# Patient Record
Sex: MALE | Race: WHITE | NOT HISPANIC OR LATINO | Employment: FULL TIME | URBAN - METROPOLITAN AREA
[De-identification: names, ages, dates, MRNs, and addresses within clinical notes are randomized per-mention and may not be internally consistent; named-entity substitution may affect disease eponyms.]

---

## 2017-06-21 ENCOUNTER — TELEPHONE (OUTPATIENT)
Dept: FAMILY MEDICINE | Facility: CLINIC | Age: 47
End: 2017-06-21

## 2017-06-21 NOTE — TELEPHONE ENCOUNTER
Spoke with patient on phone.  Patient states that I referred him to a psychiatrist in [place] last year and he went to see him and doing well but needs to make a follow up appointment and could not remember name to look up number.  Reports I had given him a paper with a group of names from a group in [place] - advised patient that I do refer to [facility] in [place] - called out list of names and patient thinks he saw Dr. Justin - gave patient name and phone number to practice.

## 2017-06-21 NOTE — TELEPHONE ENCOUNTER
----- Message from Karma Light sent at 6/21/2017 12:30 PM CDT -----  Contact: [phone]/self  Patient requesting to speak with you regarding name of  Psychiatric doctor. Please advise.

## 2017-06-22 ENCOUNTER — OFFICE VISIT (OUTPATIENT)
Dept: FAMILY MEDICINE | Facility: CLINIC | Age: 47
End: 2017-06-22
Payer: COMMERCIAL

## 2017-06-22 VITALS
HEIGHT: 74 IN | SYSTOLIC BLOOD PRESSURE: 120 MMHG | TEMPERATURE: 99 F | OXYGEN SATURATION: 98 % | DIASTOLIC BLOOD PRESSURE: 82 MMHG | HEART RATE: 68 BPM | BODY MASS INDEX: 31.04 KG/M2 | WEIGHT: 241.88 LBS

## 2017-06-22 DIAGNOSIS — F32.A ANXIETY AND DEPRESSION: Primary | ICD-10-CM

## 2017-06-22 DIAGNOSIS — F41.9 ANXIETY AND DEPRESSION: Primary | ICD-10-CM

## 2017-06-22 PROCEDURE — 99999 PR PBB SHADOW E&M-EST. PATIENT-LVL III: CPT | Mod: PBBFAC,,, | Performed by: NURSE PRACTITIONER

## 2017-06-22 PROCEDURE — 99213 OFFICE O/P EST LOW 20 MIN: CPT | Mod: S$GLB,,, | Performed by: NURSE PRACTITIONER

## 2017-06-22 RX ORDER — CITALOPRAM 10 MG/1
10 TABLET ORAL DAILY
Qty: 30 TABLET | Refills: 1 | Status: SHIPPED | OUTPATIENT
Start: 2017-06-22 | End: 2019-04-26 | Stop reason: ALTCHOICE

## 2017-06-22 NOTE — PROGRESS NOTES
Subjective:       Patient ID: Barney Kowalski is a 47 y.o. male.    Chief Complaint: Follow-up (medication check)    Patient is here today to discuss a medication change.    Patient has chronic anxiety/depression problems.  I had referred patient to Psychiatrist at last visit for medication management.  Patient is seeing Dr. Justin at [facility]. [facility].  Patient reports he was unable to get a follow up appointment with Dr. Justin [facility] 7/26/17.  Patient currently takes Lexapro 10 mg daily but complains of worsening headache and uncontrolled depression on Lexapro 10 mg .  He has been tried on several different medications in the past.  Patient has been on Cymbalta in past - reports it works but complained of sexual side effects.  Patient was then tried on Wellbutriin XL and Xanax only sparingly - patient did not take the Wellbutrin because he stated it worsened the anxiety but continued to take the Xanax but found he needed Xanax more frequently and was developing some dependency.  Patient reports he went to [facility] in [place].  He states that is where he went went he had alcohol dependency in the past and was in a 60 day inpatient program and has now been sober since 2009.  Patient states he returned to [facility] when there was concern for Xanax dependency.  He reports they put him on Lexapro and Gabapentin.  Patient is still taking the Lexapro and states it helps with anxiety but again experiencing depression,sexual dysfunction, problems with insomnia, tension headaches and grinding teeth.  States he has several family members that are well controlled on Celexa and would like to change medication.              Past Medical History:   Diagnosis Date    Alcohol addiction     Sober since 2009 - alcohol addiction - did 60 day inpatient services    Anxiety     Depression     Diverticulitis of colon     GERD (gastroesophageal reflux disease)     Hypertension 07/2016     patient states Cardiologist now prescribing medication - Dr. Macias       Past Surgical History:   Procedure Laterality Date    COLONOSCOPY  10/29/2014    DIVERTICULOSIS SIGMOID COLON; INTERNAL HEMORRHOIDS; OTHERWISE NORMAL.  REPEAT IN 10 YEARS    TONSILLECTOMY         Family History   Problem Relation Age of Onset    Hypertension Mother     No Known Problems Father     Cancer Maternal Grandmother      breast passed age 63    Hypertension Sister        Social History     Social History    Marital status:      Spouse name: N/A    Number of children: N/A    Years of education: N/A     Occupational History     [employer]     Social History Main Topics    Smoking status: Never Smoker    Smokeless tobacco: Never Used    Alcohol use No    Drug use: No    Sexual activity: Not Asked     Other Topics Concern    None     Social History Narrative    None       Review of Systems   Constitutional: Negative for activity change, appetite change, fatigue, fever and unexpected weight change.   HENT: Negative for congestion, ear pain, mouth sores, nosebleeds, postnasal drip, rhinorrhea, sinus pressure, sneezing, sore throat, trouble swallowing and voice change.         Grinding teeth, left jaw pain   Eyes: Negative.    Respiratory: Negative for cough, chest tightness and shortness of breath.    Cardiovascular: Negative for chest pain, palpitations and leg swelling.   Gastrointestinal: Negative.  Negative for abdominal pain, blood in stool, constipation, diarrhea, nausea and vomiting.   Endocrine: Negative.    Genitourinary: Negative for difficulty urinating, dysuria, flank pain, hematuria and urgency.   Musculoskeletal: Negative for arthralgias, back pain, gait problem, joint swelling, myalgias and neck pain.   Skin: Negative for color change, rash and wound.   Allergic/Immunologic: Negative for immunocompromised state.   Neurological: Positive for headaches. Negative for dizziness, tremors, seizures, syncope and  "speech difficulty.   Hematological: Negative for adenopathy. Does not bruise/bleed easily.   Psychiatric/Behavioral: Positive for decreased concentration, dysphoric mood and sleep disturbance. Negative for behavioral problems and suicidal ideas. The patient is nervous/anxious.          Objective:     Vitals:    06/22/17 1523   BP: 120/82   BP Location: Right arm   Patient Position: Sitting   BP Method: Manual   Pulse: 68   Temp: 98.8 °F (37.1 °C)   TempSrc: Oral   SpO2: 98%   Weight: 109.7 kg (241 lb 13.5 oz)   Height: 6' 2" (1.88 m)          Physical Exam   Constitutional: He is oriented to person, place, and time. He appears well-developed and well-nourished.   HENT:   Head: Normocephalic.   Right Ear: External ear normal.   Left Ear: External ear normal.   Nose: Nose normal.   Mouth/Throat: Oropharynx is clear and moist. No oropharyngeal exudate.   Eyes: EOM are normal. Pupils are equal, round, and reactive to light. Right eye exhibits no discharge. Left eye exhibits no discharge. No scleral icterus.   Neck: Normal range of motion. Neck supple. No tracheal deviation present. No thyromegaly present.   Cardiovascular: Normal rate, regular rhythm and normal heart sounds.    No murmur heard.  Pulmonary/Chest: Effort normal and breath sounds normal. No respiratory distress.   Abdominal: Soft. He exhibits no distension.   Musculoskeletal: Normal range of motion. He exhibits no edema.   Lymphadenopathy:     He has no cervical adenopathy.   Neurological: He is alert and oriented to person, place, and time. Coordination normal.   Skin: Skin is warm and dry. No rash noted.   Psychiatric: His speech is normal and behavior is normal. His mood appears anxious. He exhibits a depressed mood.         Assessment:         ICD-10-CM ICD-9-CM   1. Anxiety and depression F41.9 300.00    F32.9 311       Plan:       Anxiety and depression  -  Advised patient that I am willing to change him from Lexapro to Celexa 10 mg daily but keep " follow up appointment with Dr. Justin on 7/26/17 for all future psych. Medical management - patient verbalizes understanding.  -     citalopram (CELEXA) 10 MG tablet; Take 1 tablet (10 mg total) by mouth once daily.  Dispense: 30 tablet; Refill: 1      Return for Keep appt with psych on 7/26/2017.     Patient's Medications   New Prescriptions    CITALOPRAM (CELEXA) 10 MG TABLET    Take 1 tablet (10 mg total) by mouth once daily.   Previous Medications    OMEPRAZOLE (PRILOSEC) 40 MG CAPSULE    Take 40 mg by mouth once daily.    TEKTURNA 300 MG TAB    Take 150 mg by mouth once daily.    Modified Medications    No medications on file   Discontinued Medications    ESCITALOPRAM OXALATE (LEXAPRO) 10 MG TABLET    Take 1 tablet (10 mg total) by mouth once daily.    TEKTURNA 150 MG TAB    TAKE 1 TABLET (150 MG TOTAL) BY MOUTH ONCE DAILY.    TRAZODONE (DESYREL) 50 MG TABLET    Titrate up to 3 tablets at bedtime as needed for sleep

## 2019-03-27 DIAGNOSIS — Z00.00 ROUTINE GENERAL MEDICAL EXAMINATION AT A HEALTH CARE FACILITY: Primary | ICD-10-CM

## 2019-04-26 ENCOUNTER — CLINICAL SUPPORT (OUTPATIENT)
Dept: INTERNAL MEDICINE | Facility: CLINIC | Age: 49
End: 2019-04-26

## 2019-04-26 ENCOUNTER — HOSPITAL ENCOUNTER (OUTPATIENT)
Dept: RADIOLOGY | Facility: HOSPITAL | Age: 49
Discharge: HOME | End: 2019-04-26
Attending: INTERNAL MEDICINE

## 2019-04-26 ENCOUNTER — HOSPITAL ENCOUNTER (OUTPATIENT)
Dept: CARDIOLOGY | Facility: CLINIC | Age: 49
Discharge: HOME | End: 2019-04-26
Attending: INTERNAL MEDICINE

## 2019-04-26 ENCOUNTER — OFFICE VISIT (OUTPATIENT)
Dept: PULMONOLOGY | Facility: CLINIC | Age: 49
End: 2019-04-26

## 2019-04-26 VITALS
BODY MASS INDEX: 28.23 KG/M2 | SYSTOLIC BLOOD PRESSURE: 137 MMHG | DIASTOLIC BLOOD PRESSURE: 83 MMHG | WEIGHT: 220 LBS | HEART RATE: 48 BPM | HEIGHT: 74 IN

## 2019-04-26 DIAGNOSIS — Z00.00 ROUTINE GENERAL MEDICAL EXAMINATION AT A HEALTH CARE FACILITY: Primary | ICD-10-CM

## 2019-04-26 DIAGNOSIS — Z00.00 ANNUAL PHYSICAL EXAM: Primary | ICD-10-CM

## 2019-04-26 DIAGNOSIS — Z00.00 ROUTINE GENERAL MEDICAL EXAMINATION AT A HEALTH CARE FACILITY: ICD-10-CM

## 2019-04-26 LAB
ALBUMIN SERPL BCP-MCNC: 4 G/DL (ref 3.5–5.2)
ALP SERPL-CCNC: 56 U/L (ref 55–135)
ALT SERPL W/O P-5'-P-CCNC: 23 U/L (ref 10–44)
ANION GAP SERPL CALC-SCNC: 7 MMOL/L (ref 8–16)
AST SERPL-CCNC: 17 U/L (ref 10–40)
BILIRUB SERPL-MCNC: 0.6 MG/DL (ref 0.1–1)
BUN SERPL-MCNC: 14 MG/DL (ref 6–20)
CALCIUM SERPL-MCNC: 10 MG/DL (ref 8.7–10.5)
CHLORIDE SERPL-SCNC: 105 MMOL/L (ref 95–110)
CHOLEST SERPL-MCNC: 170 MG/DL (ref 120–199)
CHOLEST/HDLC SERPL: 3 {RATIO} (ref 2–5)
CO2 SERPL-SCNC: 28 MMOL/L (ref 23–29)
COMPLEXED PSA SERPL-MCNC: 0.59 NG/ML (ref 0–4)
CREAT SERPL-MCNC: 1 MG/DL (ref 0.5–1.4)
CV STRESS BASE HR: 46 BPM
DIASTOLIC BLOOD PRESSURE: 87 MMHG
ERYTHROCYTE [DISTWIDTH] IN BLOOD BY AUTOMATED COUNT: 12.2 % (ref 11.5–14.5)
EST. GFR  (AFRICAN AMERICAN): >60 ML/MIN/1.73 M^2
EST. GFR  (NON AFRICAN AMERICAN): >60 ML/MIN/1.73 M^2
ESTIMATED AVG GLUCOSE: 114 MG/DL (ref 68–131)
GLUCOSE SERPL-MCNC: 88 MG/DL (ref 70–110)
HBA1C MFR BLD HPLC: 5.6 % (ref 4–5.6)
HCT VFR BLD AUTO: 45.6 % (ref 40–54)
HDLC SERPL-MCNC: 57 MG/DL (ref 40–75)
HDLC SERPL: 33.5 % (ref 20–50)
HGB BLD-MCNC: 15.4 G/DL (ref 14–18)
LDLC SERPL CALC-MCNC: 103.2 MG/DL (ref 63–159)
MCH RBC QN AUTO: 28.2 PG (ref 27–31)
MCHC RBC AUTO-ENTMCNC: 33.8 G/DL (ref 32–36)
MCV RBC AUTO: 84 FL (ref 82–98)
NONHDLC SERPL-MCNC: 113 MG/DL
OHS CV CPX 1 MINUTE RECOVERY HEART RATE: 121 BPM
OHS CV CPX 85 PERCENT MAX PREDICTED HEART RATE MALE: 148
OHS CV CPX ESTIMATED METS: 20
OHS CV CPX MAX PREDICTED HEART RATE: 174
OHS CV CPX PATIENT IS FEMALE: 0
OHS CV CPX PATIENT IS MALE: 1
OHS CV CPX PEAK DIASTOLIC BLOOD PRESSURE: 65 MMHG
OHS CV CPX PEAK HEAR RATE: 153 BPM
OHS CV CPX PEAK RATE PRESSURE PRODUCT: NORMAL
OHS CV CPX PEAK SYSTOLIC BLOOD PRESSURE: 150 MMHG
OHS CV CPX PERCENT MAX PREDICTED HEART RATE ACHIEVED: 88
OHS CV CPX PERCENT TARGET HEART RATE ACHIEVED: 104.08
OHS CV CPX RATE PRESSURE PRODUCT PRESENTING: 6026
OHS CV CPX TARGET HEART RATE: 147
PLATELET # BLD AUTO: 216 K/UL (ref 150–350)
PMV BLD AUTO: 10.2 FL (ref 9.2–12.9)
POTASSIUM SERPL-SCNC: 3.8 MMOL/L (ref 3.5–5.1)
PROT SERPL-MCNC: 6.8 G/DL (ref 6–8.4)
RBC # BLD AUTO: 5.46 M/UL (ref 4.6–6.2)
SODIUM SERPL-SCNC: 140 MMOL/L (ref 136–145)
STRESS ECHO POST EXERCISE DUR MIN: 12 MIN
STRESS ECHO POST EXERCISE DUR SEC: 45
SYSTOLIC BLOOD PRESSURE: 131 MMHG
TRIGL SERPL-MCNC: 49 MG/DL (ref 30–150)
TSH SERPL DL<=0.005 MIU/L-ACNC: 1.65 UIU/ML (ref 0.4–4)
WBC # BLD AUTO: 6.51 K/UL (ref 3.9–12.7)

## 2019-04-26 PROCEDURE — 71046 X-RAY EXAM CHEST 2 VIEWS: CPT | Mod: TC,FY

## 2019-04-26 PROCEDURE — 85027 COMPLETE CBC AUTOMATED: CPT

## 2019-04-26 PROCEDURE — 93015 TREADMILL STRESS TEST (CUPID ONLY): ICD-10-PCS | Mod: S$GLB,,, | Performed by: INTERNAL MEDICINE

## 2019-04-26 PROCEDURE — 97750 PHYSICAL PERFORMANCE TEST: CPT | Mod: S$GLB,,, | Performed by: INTERNAL MEDICINE

## 2019-04-26 PROCEDURE — 99999 PR PBB SHADOW E&M-EST. PATIENT-LVL III: CPT | Mod: PBBFAC,,, | Performed by: INTERNAL MEDICINE

## 2019-04-26 PROCEDURE — 36415 COLL VENOUS BLD VENIPUNCTURE: CPT

## 2019-04-26 PROCEDURE — 80061 LIPID PANEL: CPT

## 2019-04-26 PROCEDURE — 71046 X-RAY EXAM CHEST 2 VIEWS: CPT | Mod: 26,,, | Performed by: RADIOLOGY

## 2019-04-26 PROCEDURE — 84443 ASSAY THYROID STIM HORMONE: CPT

## 2019-04-26 PROCEDURE — 97750 PR PHYSICAL PERFORMANCE TEST: ICD-10-PCS | Mod: S$GLB,,, | Performed by: INTERNAL MEDICINE

## 2019-04-26 PROCEDURE — 97802 MEDICAL NUTRITION INDIV IN: CPT | Mod: S$GLB,,, | Performed by: INTERNAL MEDICINE

## 2019-04-26 PROCEDURE — 83036 HEMOGLOBIN GLYCOSYLATED A1C: CPT

## 2019-04-26 PROCEDURE — 99386 PR PREVENTIVE VISIT,NEW,40-64: ICD-10-PCS | Mod: S$GLB,,, | Performed by: INTERNAL MEDICINE

## 2019-04-26 PROCEDURE — 84153 ASSAY OF PSA TOTAL: CPT

## 2019-04-26 PROCEDURE — 99386 PREV VISIT NEW AGE 40-64: CPT | Mod: S$GLB,,, | Performed by: INTERNAL MEDICINE

## 2019-04-26 PROCEDURE — 99999 PR PBB SHADOW E&M-EST. PATIENT-LVL III: ICD-10-PCS | Mod: PBBFAC,,, | Performed by: INTERNAL MEDICINE

## 2019-04-26 PROCEDURE — 97802 PR MED NUTR THER, 1ST, INDIV, EA 15 MIN: ICD-10-PCS | Mod: S$GLB,,, | Performed by: INTERNAL MEDICINE

## 2019-04-26 PROCEDURE — 80053 COMPREHEN METABOLIC PANEL: CPT

## 2019-04-26 PROCEDURE — 71046 XR CHEST PA AND LATERAL: ICD-10-PCS | Mod: 26,,, | Performed by: RADIOLOGY

## 2019-04-26 PROCEDURE — 93015 CV STRESS TEST SUPVJ I&R: CPT | Mod: S$GLB,,, | Performed by: INTERNAL MEDICINE

## 2019-04-26 RX ORDER — SERTRALINE HYDROCHLORIDE 50 MG/1
1 TABLET, FILM COATED ORAL DAILY
Refills: 2 | COMMUNITY
Start: 2019-04-02 | End: 2022-03-14

## 2019-04-26 NOTE — PROGRESS NOTES
Subjective:       Patient ID: Barney Kowalski is a 48 y.o. male.    Chief Complaint: No chief complaint on file.    HPI   Pt. Has no significant cardiovascular or pulmonary history.    Physical Limitations:  None.      Current exercise routine:  Patient does not follow any formal exercise or flexibility routine at the current time.    Goals:  Patient set a gaol weight of 212 lbs.    Fun Facts:  Patient was very friendly and engaged.  Patient was very receptive to and thankful for all recommendations made.      Review of Systems    Objective:     The fitness evaluation results are as follows:  D.O.S. 4/26/2019   Height (in): 73.5   Weight (lbs): 222   BMI: 28.048186   Body Fat (%): 20.64   Waist (cm): 106   Hip (cm): 109   WHR: 0.97   RBP (mmHg): 126/84   RHR (bpm): 52    Strength R (lbs)t: 130    Strength Lt (lbs): 135   Push-up Assessment: 24   Curl-up Assessment: 33   Flexibility Testing (cm): 15   REE (kcals): 2430       Physical Exam    Assessment:     Age/gender stratified assessment:  Resting BP: Within Normal Limits   Body Fat %: Very Good   WHR Risk Factor: Moderate Risk    Strength R: Average    Strength L: Above Average   Upper Body Endurance: Very Good   Abdominal Endurance: Below Average   Lower body Flexibiltiy: Needs Improvement       1. Routine general medical examination at a health care facility        Plan:       Recommended fitness guidelines:    -150 minutes of moderate intensity aerobic exercise per week or 75 minutes of vigorous intensity aerobic exercise per week.  Try to reach 150 minutes of moderate to vigorous intensity aerobic activity per week.    -2 to 4 days per week of resistance training for each muscle group.   Start to incorporate the upper and lower body resistance training program along with the core stabilization program, given during the evaluation.      -Daily stretching with a hold of at least 30 seconds per muscle group.  Practice the seated hamstring  stretch, demonstrated during the evaluation, daily.

## 2019-04-26 NOTE — PROGRESS NOTES
"Nutrition Assessment  Client name:  Barney Kowalski  :  1970  Age:  48 y.o.  Gender:  male    Client states:  Very pleasant [employer] employee here for his initial Executive Health physical.  Employed with [employer] and commutes an hour to and from [employer] daily.   with two children, ages 16 and 17.  Has a history of anxiety, HTN, and GERD, which he treats with Prilosec every other day.  Was initially prescribed anti-hypertensive medications although no longer takes them as his BP is well controlled (120-130/80) since losing weight.  Formerly, weighed 245# yet adhered to the ketogenic diet for a month and thereafter, lost 20#, weighing 220#.  Realizes such diet is "probably not the healthiest" as he was primarily consuming eggs and castro every day.  Is interested in losing an additional 5-10# and so, wonders if his eating habits are good.  Typically skips breakfast followed by lunch provided by employer consisting of a deli meat sandwich, chips, water, and Coke or Coke Zero in addition to a home cooked meal prepared by his wife.  Notes that his wife has been attempting to prepare healthier meals for them, recalling dinner last night of grilled chicken and spaghetti squash.  Also, snacks throughout the day, attempting to limit vending machine and other such processed snacks provided at work.  Craves "something sweet" after dinner and so, indulges in two flavored Greek yogurts before bed.  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  Overall, desires to achieve goal weight of 210# via dietary modifications outlined below.    *Patient's preferred name is Khris.    Anthropometrics  Height:  6' 1.5"     Weight:  222#  BMI:  28.9  % Body Fat:  20.64%    Clinical Signs/Symptoms  N/V/D:  None  Appetite (Good, Fair, or Poor):  Good      Past Medical History:   Diagnosis Date    Anxiety     Anxiety and " depression 8/1/2016    Depression     Diverticulitis of colon     GERD (gastroesophageal reflux disease)     History of alcohol dependence     Sober since 2009 - alcohol addiction - did 60 day inpatient services       Past Surgical History:   Procedure Laterality Date    COLONOSCOPY  10/29/2014    DIVERTICULOSIS SIGMOID COLON; INTERNAL HEMORRHOIDS; OTHERWISE NORMAL.  REPEAT IN 10 YEARS    ESOPHAGOGASTRODUODENOSCOPY (EGD) N/A 10/31/2016    Performed by Pradip Bonner Jr., [facility] at [facility]    TONSILLECTOMY         Medications    has a current medication list which includes the following prescription(s): omeprazole, sertraline, and tekturna.    Vitamins, Minerals, and/or Supplements:  MVI (although does not take daily)     Food/Medication Interactions:  Reviewed     Food Allergies or Intolerances:  NKFA     Social History    Marital status:    Employment:  [employer]    Social History     Tobacco Use    Smoking status: Never Smoker    Smokeless tobacco: Never Used   Substance Use Topics    Alcohol use: No     Alcohol/week: 0.0 oz        Lab Reports   Total Cholesterol:  170    Triglycerides:  49  HDL:  57  LDL:  103.2   Glucose:  88  HbA1c:  5.6%  BP:  137/83     Food History  Breakfast:  Skips/coffee  Mid-morning Snack:  None  Lunch:  Deli meat sandwich on white + chips + Coke or Coke Zero + water  Mid-afternoon Snack:  Coffee  Dinner:  Chicken + spaghetti squash + Coke, Coke Zero, or water  H.S. Snack:   2 flavored Greek yogurts  *Fluid intake:  Coffee, water (~10 - 16 oz bottles/day), Coke, Coke Zero    Exercise History:  None    Cultural/Spiritual/Personal Preferences:  None identified    Support System:  Wife    State of Change:  Contemplation    Barriers to Change:  Time constraints managing two [employer]    Diagnosis    Overweight related to inadequate physical activity and imbalanced meals and snacks as evidenced by BMI:  28.9.    Intervention    RMR (Method:  Body [place]):  2430  kcal  Activity Factor:  1.3  DOMINICK:  3159 - 250 = 2909 kcal    Goals:  1.  Achieve goal weight of 210#  2.  Remain physically active via ADL  3.  Consider eliminating H.S. Snack  4.  Avoid skipping breakfast  5.  Incorporate lean protein with snacks (examples provided)  6.  Consider incorporating vegetables with lunch (ex. bagged salad, frozen vegetables, etc.)  7.  Defer BP management to MD/PCP    Nutrition Education  Reviewed CMP, lipid panel, and HbA1c, noting improved lipid panel over the past few years, which patient attributes to weight loss.  Supported patient's desire for modest weight loss, discussing BMI and ways to achieve via dietary modifications.  Encouraged patient to avoid meal skipping, reviewing the potential health consequences of such.  Suggested consideration of a meal replacement shake (ex. Premier Protein + 1 serving fruit) for ease and convenience.  Also, discussed healthy snacking, including components of a healthy snack, importance and benefits of protein pairing, food sources of lean protein, and examples of healthy snack choices.  Addressed patient's inquiry regarding sugar cravings at night, encouraging avoidance of meal skipping, increased protein intake with snacks, and inclusion of non-starchy vegetables with lunch for added fiber, satiety, and reduction in overeating.  Furthermore, reviewed the benefits of physical activity, encouraging maintenance of an active lifestyle as patient's current schedule does not allow time for it now.  (Took detailed notes for patient outlining goals discussed above, which patient verbalized understanding and commitment toward.)    Patient verbalized understanding of nutrition education and recommendations received.    Handouts Provided  Meal Planning Guide  Restaurant Guide  Eat Fit Shopping List  Eat Fit Inna  Fast Food Guide  Vitamin/Mineral Guide    Monitoring/Evaluation    Monitor the following:  Weight  BMI  % Body Fat  Caloric intake  Blood  pressure    Follow Up Plan:  Communication with referring healthcare provider is unnecessary at this time as patient presented as part of annual wellness exam.  However, will follow up with patient in 1-2 years.

## 2019-04-26 NOTE — LETTER
April 26, 2019    Barney MARTELL Dex  [address] [address]             [facility]  [address]  [place] [address]  Phone: [phone] Dear Mr. Kowalski:    Thank you for allowing me to serve you and perform your Executive Health exam on 4/26/2019. This letter will serve as a brief summary of the physical findings and laboratory/studies performed and recommendations at this time. Today's assessment is essentially normal. Eventhough you don't do regular exercise your stress test is way above average.         If you have any questions or concerns, please don't hesitate to call.    Sincerely,        Barney Bautista MD

## 2019-04-26 NOTE — PROGRESS NOTES
Subjective:       Patient ID: Barney Kowalski is a 48 y.o. male.    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 Meds: Zoloft, and prilosec    Review of Systems   Constitutional: Negative.    HENT: Negative.    Eyes: Negative.    Respiratory: Negative.    Cardiovascular: Negative.    Gastrointestinal: Negative.    Genitourinary: Negative.    Musculoskeletal: Negative.    Skin: Negative.    Neurological: Negative.    Psychiatric/Behavioral: The patient is nervous/anxious.         On medication for chronic anxiety   All other systems reviewed and are negative.      Objective:      Physical Exam   Constitutional: He is oriented to person, place, and time. He appears well-developed and well-nourished.   HENT:   Head: Normocephalic and atraumatic.   Right Ear: External ear normal.   Left Ear: External ear normal.   Eyes: Pupils are equal, round, and reactive to light. Conjunctivae and EOM are normal.   Neck: Normal range of motion. Neck supple.   Cardiovascular: Normal rate, regular rhythm and normal heart sounds.   Negative Stress EKG at high workload, 20 METS   Pulmonary/Chest: Effort normal and breath sounds normal.   Peak flow 700 l/min   Abdominal: Soft. Bowel sounds are normal.   Musculoskeletal: Normal range of motion.   Neurological: He is alert and oriented to person, place, and time. He has normal reflexes.   Skin: Skin is warm and dry.   Psychiatric: He has a normal mood and affect. His behavior is normal. Judgment and thought content normal.       Assessment:       1. Annual physical exam        Plan:       Labs: Al  parameters are normal. Chest x-ray is clear and Stress EKG is negative for ischemia. IMP: Healthy Male

## 2019-10-23 ENCOUNTER — OFFICE VISIT (OUTPATIENT)
Dept: INTERNAL MEDICINE | Facility: CLINIC | Age: 49
End: 2019-10-23
Payer: COMMERCIAL

## 2019-10-23 VITALS
SYSTOLIC BLOOD PRESSURE: 120 MMHG | WEIGHT: 219.5 LBS | RESPIRATION RATE: 18 BRPM | DIASTOLIC BLOOD PRESSURE: 74 MMHG | BODY MASS INDEX: 28.17 KG/M2 | OXYGEN SATURATION: 97 % | HEIGHT: 74 IN | HEART RATE: 45 BPM | TEMPERATURE: 98 F

## 2019-10-23 DIAGNOSIS — M54.2 POSTERIOR NECK PAIN: Primary | ICD-10-CM

## 2019-10-23 PROCEDURE — 90686 FLU VACCINE (QUAD) GREATER THAN OR EQUAL TO 3YO PRESERVATIVE FREE IM: ICD-10-PCS | Mod: S$GLB,,, | Performed by: INTERNAL MEDICINE

## 2019-10-23 PROCEDURE — 90471 FLU VACCINE (QUAD) GREATER THAN OR EQUAL TO 3YO PRESERVATIVE FREE IM: ICD-10-PCS | Mod: S$GLB,,, | Performed by: INTERNAL MEDICINE

## 2019-10-23 PROCEDURE — 99999 PR PBB SHADOW E&M-EST. PATIENT-LVL IV: CPT | Mod: PBBFAC,,, | Performed by: INTERNAL MEDICINE

## 2019-10-23 PROCEDURE — 90686 IIV4 VACC NO PRSV 0.5 ML IM: CPT | Mod: S$GLB,,, | Performed by: INTERNAL MEDICINE

## 2019-10-23 PROCEDURE — 99213 OFFICE O/P EST LOW 20 MIN: CPT | Mod: 25,S$GLB,, | Performed by: INTERNAL MEDICINE

## 2019-10-23 PROCEDURE — 90471 IMMUNIZATION ADMIN: CPT | Mod: S$GLB,,, | Performed by: INTERNAL MEDICINE

## 2019-10-23 PROCEDURE — 99999 PR PBB SHADOW E&M-EST. PATIENT-LVL IV: ICD-10-PCS | Mod: PBBFAC,,, | Performed by: INTERNAL MEDICINE

## 2019-10-23 PROCEDURE — 99213 PR OFFICE/OUTPT VISIT, EST, LEVL III, 20-29 MIN: ICD-10-PCS | Mod: 25,S$GLB,, | Performed by: INTERNAL MEDICINE

## 2019-10-23 NOTE — PROGRESS NOTES
INTERNAL MEDICINE    Patient Active Problem List   Diagnosis    Hypertension    GERD (gastroesophageal reflux disease)    History of alcohol dependence       CC:   Chief Complaint   Patient presents with    Pain     generalized joint pain x 4 weeks    Neck Pain    Throat     lump in throat       SUBJECTIVE:  Barney Kowalski   is a 49 y.o. male  Neck Pain    The current episode started more than 1 month ago. The problem occurs daily. The problem has been unchanged. The pain is associated with an unknown factor. The pain is present in the occipital region. The quality of the pain is described as aching. The pain is at a severity of 3/10. The pain is mild. The pain is same all the time. Stiffness is present all day. Pertinent negatives include no headaches, numbness or weakness. He has tried NSAIDs for the symptoms. The treatment provided no relief.      , uses a computer all day.  ROS: Review of Systems   Constitutional: Negative.    HENT: Negative.    Eyes: Negative.    Respiratory: Negative.    Cardiovascular: Negative.    Gastrointestinal: Negative.    Endocrine: Negative.    Genitourinary: Negative.    Musculoskeletal: Positive for neck pain. Negative for back pain and gait problem.   Skin: Negative.    Neurological: Negative.  Negative for dizziness, seizures, weakness, numbness and headaches.   Hematological: Negative.    Psychiatric/Behavioral: Negative.        Past Medical History:   Diagnosis Date    Anxiety     Anxiety and depression 8/1/2016    Depression     Diverticulitis of colon     GERD (gastroesophageal reflux disease)     History of alcohol dependence     Sober since 2009 - alcohol addiction - did 60 day inpatient services       Past Surgical History:   Procedure Laterality Date    COLONOSCOPY  10/29/2014    DIVERTICULOSIS SIGMOID COLON; INTERNAL HEMORRHOIDS; OTHERWISE NORMAL.  REPEAT IN 10 YEARS    TONSILLECTOMY         Family History   Problem Relation Age of Onset     "Hypertension Mother     No Known Problems Father     Breast cancer Maternal Grandmother 63    Hypertension Sister        Social History     Tobacco Use    Smoking status: Never Smoker    Smokeless tobacco: Never Used   Substance Use Topics    Alcohol use: No     Alcohol/week: 0.0 standard drinks    Drug use: No       Social History     Social History Narrative    Not on file       ALLERGIES: Review of patient's allergies indicates:  No Known Allergies    MEDS:   Current Outpatient Medications:     omeprazole (PRILOSEC) 40 MG capsule, Take 40 mg by mouth once daily., Disp: , Rfl:     sertraline (ZOLOFT) 50 MG tablet, 1 tablet once daily., Disp: , Rfl: 2    TEKTURNA 300 mg Tab, Take 150 mg by mouth once daily. , Disp: , Rfl:     OBJECTIVE:   Vitals:    10/23/19 1136   BP: 120/74   BP Location: Left arm   Patient Position: Sitting   BP Method: X-Large (Manual)   Pulse: (!) 45   Resp: 18   Temp: 97.6 °F (36.4 °C)   TempSrc: Oral   SpO2: 97%   Weight: 99.6 kg (219 lb 8 oz)   Height: 6' 2" (1.88 m)     Body mass index is 28.18 kg/m².    Physical Exam   Constitutional: He is oriented to person, place, and time. He appears well-developed and well-nourished.   HENT:   Head: Normocephalic and atraumatic.   Right Ear: External ear normal.   Left Ear: External ear normal.   Nose: Nose normal.   Mouth/Throat: Oropharynx is clear and moist.   Eyes: Pupils are equal, round, and reactive to light. Conjunctivae are normal.   Neck: Normal range of motion. Neck supple.   Cardiovascular: Normal rate, regular rhythm and normal heart sounds.   Pulmonary/Chest: Effort normal and breath sounds normal.   Abdominal: Soft. Bowel sounds are normal.   Musculoskeletal: Normal range of motion.        Cervical back: Normal. He exhibits normal range of motion, no tenderness, no edema, no pain and no spasm.        Back:    Lymphadenopathy:     He has no cervical adenopathy.   Neurological: He is alert and oriented to person, place, and " time. He displays normal reflexes. No cranial nerve deficit or sensory deficit. He exhibits normal muscle tone. Coordination normal.   Skin: Skin is warm and dry.   Psychiatric: He has a normal mood and affect. His behavior is normal.   Nursing note and vitals reviewed.        PERTINENT RESULTS:   CBC:  No results for input(s): WBC, RBC, HGB, HCT, PLT, MCV, MCH, MCHC in the last 2160 hours.  CMP:  No results for input(s): GLU, CALCIUM, ALBUMIN, PROT, NA, K, CO2, CL, BUN, ALKPHOS, ALT, AST, BILITOT in the last 2160 hours.    Invalid input(s): CREATININ  URINALYSIS:  No results for input(s): COLORU, CLARITYU, SPECGRAV, PHUR, PROTEINUA, GLUCOSEU, BILIRUBINCON, BLOODU, WBCU, RBCU, BACTERIA, MUCUS, NITRITE, LEUKOCYTESUR, UROBILINOGEN, HYALINECASTS in the last 2160 hours.   LIPIDS:  No results for input(s): TSH, HDL, CHOL, TRIG, LDLCALC, CHOLHDL, NONHDLCHOL, TOTALCHOLEST in the last 2160 hours.          ASSESSMENT:  Problem List Items Addressed This Visit     None      Visit Diagnoses     Posterior neck pain    -  Primary      Believe this is more an ergonomic issues, of his poor posture,hunching over a computer keypad.  Have discussed options with pt.  He may take tylenol, or get up and stretch every30 min,exercises for neck, etc.    PLAN:   Orders Placed This Encounter    Influenza - Quadrivalent (PF)     Orders Placed This Encounter   Procedures    Influenza - Quadrivalent (PF)       Follow-up with us in prn.   Dr. William Slade  Internal Medicine

## 2019-11-06 ENCOUNTER — PATIENT OUTREACH (OUTPATIENT)
Dept: ADMINISTRATIVE | Facility: OTHER | Age: 49
End: 2019-11-06

## 2019-11-08 ENCOUNTER — OFFICE VISIT (OUTPATIENT)
Dept: OTOLARYNGOLOGY | Facility: CLINIC | Age: 49
End: 2019-11-08
Payer: COMMERCIAL

## 2019-11-08 VITALS
HEART RATE: 52 BPM | HEIGHT: 74 IN | DIASTOLIC BLOOD PRESSURE: 76 MMHG | WEIGHT: 222.31 LBS | TEMPERATURE: 98 F | SYSTOLIC BLOOD PRESSURE: 124 MMHG | BODY MASS INDEX: 28.53 KG/M2

## 2019-11-08 DIAGNOSIS — K21.9 LARYNGOPHARYNGEAL REFLUX (LPR): Primary | ICD-10-CM

## 2019-11-08 PROCEDURE — 99999 PR PBB SHADOW E&M-EST. PATIENT-LVL III: CPT | Mod: PBBFAC,,, | Performed by: OTOLARYNGOLOGY

## 2019-11-08 PROCEDURE — 31575 PR LARYNGOSCOPY, FLEXIBLE; DIAGNOSTIC: ICD-10-PCS | Mod: S$GLB,,, | Performed by: OTOLARYNGOLOGY

## 2019-11-08 PROCEDURE — 99999 PR PBB SHADOW E&M-EST. PATIENT-LVL III: ICD-10-PCS | Mod: PBBFAC,,, | Performed by: OTOLARYNGOLOGY

## 2019-11-08 PROCEDURE — 99203 PR OFFICE/OUTPT VISIT, NEW, LEVL III, 30-44 MIN: ICD-10-PCS | Mod: 25,S$GLB,, | Performed by: OTOLARYNGOLOGY

## 2019-11-08 PROCEDURE — 99203 OFFICE O/P NEW LOW 30 MIN: CPT | Mod: 25,S$GLB,, | Performed by: OTOLARYNGOLOGY

## 2019-11-08 PROCEDURE — 31575 DIAGNOSTIC LARYNGOSCOPY: CPT | Mod: S$GLB,,, | Performed by: OTOLARYNGOLOGY

## 2019-11-08 RX ORDER — SERTRALINE HYDROCHLORIDE 25 MG/1
TABLET, FILM COATED ORAL
COMMUNITY
Start: 2019-11-04 | End: 2022-03-14

## 2019-11-08 RX ORDER — MELOXICAM 15 MG/1
TABLET ORAL
COMMUNITY
Start: 2019-10-25 | End: 2022-03-22 | Stop reason: ALTCHOICE

## 2019-11-08 RX ORDER — OMEPRAZOLE 40 MG/1
40 CAPSULE, DELAYED RELEASE ORAL
Qty: 30 CAPSULE | Refills: 1 | Status: SHIPPED | OUTPATIENT
Start: 2019-11-08 | End: 2023-03-30

## 2019-11-08 NOTE — PROGRESS NOTES
Otolaryngology Clinic Note    Barney Kowalski  Encounter Date: 11/8/2019   YOB: 1970  Referring Physician: Aaareferral Self  No address on file   PCP: Radha Padilla NP    Chief Complaint:   Chief Complaint   Patient presents with    Other     Patient states feels like something is stuck in his throat.       HPI: Barney Kowalski is a 49 y.o. male here with globus sensation, burning in throat, frequent throat clearing. Was going on for a few weeks. Does take Prilosec 40 mg daily. He feels it helps. He has horrible reflux if he doesn't take it. He does feel his symptoms have gotten slightly better. Denies nasal congestion, post nasal drip, sinus pressure or pain. No cough. Thought he had lost weight and on review of records he has lost about 20 lbs since 2017 but weight since April has been stable. He has been on diets in the past.    Review of Systems   Constitutional: Negative for chills, fever and weight loss.   HENT: Negative for ear pain and hearing loss.    Eyes: Negative for blurred vision and double vision.   Respiratory: Negative for cough and shortness of breath.    Cardiovascular: Negative for chest pain and palpitations.   Gastrointestinal: Negative for nausea and vomiting.   Musculoskeletal: Negative for joint pain and neck pain.   Skin: Negative for rash.   Neurological: Negative for dizziness, focal weakness and headaches.   Psychiatric/Behavioral: Negative for depression. The patient is not nervous/anxious.         Review of patient's allergies indicates:  No Known Allergies    Past Medical History:   Diagnosis Date    Anxiety     Anxiety and depression 8/1/2016    Depression     Diverticulitis of colon     GERD (gastroesophageal reflux disease)     History of alcohol dependence     Sober since 2009 - alcohol addiction - did 60 day inpatient services       Past Surgical History:   Procedure Laterality Date    COLONOSCOPY  10/29/2014    DIVERTICULOSIS SIGMOID COLON; INTERNAL  HEMORRHOIDS; OTHERWISE NORMAL.  REPEAT IN 10 YEARS    TONSILLECTOMY         Social History     Socioeconomic History    Marital status:      Spouse name: Not on file    Number of children: Not on file    Years of education: Not on file    Highest education level: Not on file   Occupational History     Employer: [employer]   Social Needs    Financial resource strain: Not on file    Food insecurity:     Worry: Not on file     Inability: Not on file    Transportation needs:     Medical: Not on file     Non-medical: Not on file   Tobacco Use    Smoking status: Never Smoker    Smokeless tobacco: Never Used   Substance and Sexual Activity    Alcohol use: No     Alcohol/week: 0.0 standard drinks    Drug use: No    Sexual activity: Not on file   Lifestyle    Physical activity:     Days per week: Not on file     Minutes per session: Not on file    Stress: Not on file   Relationships    Social connections:     Talks on phone: Not on file     Gets together: Not on file     Attends Sabianist service: Not on file     Active member of club or organization: Not on file     Attends meetings of clubs or organizations: Not on file     Relationship status: Not on file   Other Topics Concern    Not on file   Social History Narrative    Not on file       Family History   Problem Relation Age of Onset    Hypertension Mother     No Known Problems Father     Breast cancer Maternal Grandmother 63    Hypertension Sister        Outpatient Encounter Medications as of 11/8/2019   Medication Sig Dispense Refill    meloxicam (MOBIC) 15 MG tablet       sertraline (ZOLOFT) 25 MG tablet       [DISCONTINUED] omeprazole (PRILOSEC) 40 MG capsule Take 40 mg by mouth once daily.      omeprazole (PRILOSEC) 40 MG capsule Take 1 capsule (40 mg total) by mouth 2 (two) times daily before meals. 30 capsule 1    sertraline (ZOLOFT) 50 MG tablet 1 tablet once daily.  2    TEKTURNA 300 mg Tab Take 150 mg by mouth once daily.        No  facility-administered encounter medications on file as of 11/8/2019.        Physical Exam:    Vitals:    11/08/19 0806   BP: 124/76   Pulse: (!) 52   Temp: 98 °F (36.7 °C)       Constitutional  General Appearance: well nourished, well-developed, alert, oriented, in no acute distress  Communication: ability, understanding, voice quality normal  Head and Face  Inspection: normocephalic, atraumatic, no scars, lesions or masses    Palpation: no stepoffs, sinus tenderness or masses  Parotid glands: no masses, stones, swelling or tenderness  Eyes  Ocular Motility / Alignment: normal alignment, motility, no proptosis, enophthalmus or nystagmus  Conjunctiva: not injected  Eyelids: no entropion or ectropion, no edema  Ears  Hearing: speech reception thresholds grossly normal  External Ears: no auricle lesions, non-tender, mobile to palpation  Otoscopy:  Right Ear: no tympanic membrane lesions, perforations, or effusion, normal EAC  Left Ear: no tympanic membrane lesions, perforations, or effusion, normal EAC  Nose  External Nose: no lesions, tenderness, trauma or deformity  Intranasal Exam: no edema, erythema, discharge, mass or obstruction  Oral Cavity / Oropharynx  Lips: upper and lower lips pink and moist  Teeth: dentition good  Gingiva: healthy  Oral Mucosa: moist, no mucosal lesions  Floor of Mouth: normal, no lesions, salivary ducts patent  Tongue: moist, normal mobility, no lesions  Palate: soft and hard palates without lesions or ulcers  Oropharynx: tonsils and walls without erythema, exudate, lesions, fullness or obstruction  Neck  Inspection and Palpation: no erythema, induration, emphysema, tenderness or masses  Larynx and Trachea: normal position and mobility   Thyroid: no tenderness, enlargement or nodules  Submandibular Glands: no masses or tenderness  Lymphatic:  Anterior, Posterior, Submandibular, Submental, Supraclavicular: no lymphadenopathy present  Chest / Respiratory  Chest: no stridor or retractions,  normal effort and expansion  Cardiovascular:  Pulses: 2+ radial pulses, regular rhythm and rate  Auscultation: deferred  Neurological  Cranial Nerves: grossly intact  General: no focal deficits  Psychiatric  Orientation: oriented to time, place and person  Mood and Affect: no depression, anxiety or agitation  Extremities  Inspection: moves all extremities well    Procedures:  Flexible Fiberoptic Laryngoscopy    Indications: Symptoms concerning for LPR including post-nasal drip, frequent throat clearing, cough, globus sensation    Anesthesia: Topical xylocaine with venecia-synephrine    Complications: None    Findings: moderate interarytenoid edema and erythema    Procedure in Detail: After verbal consent obtained and risks, benefits and alternatives were discussed with the patient, nares were decongested and anesthetized, and flexible fiberoptic laryngoscope passed via right nare with following results:  - Nasal cavity: slight left septal deviation, no inferior turbinate hypertrophy, no mass or lesion  - Nasopharynx: no adenoid hypertrophy, no mass or lesion  - Oropharynx: no lingual tonsil asymmetry, has what almost looks like a tonsolith in his lingual tonsils on the right but no ulceration or mass   - Hypopharynx: no mass or lesion  - Supraglottis: no mass or lesion, moderate interarytenoid edema and erythema  - Glottis: bilateral true vocal cords with normal mobility, no edema, no mass or lesion    Patient tolerated the procedure well        Pertinent Data:  ? LABS:   ? AUDIO:  ? PATH:      I personally reviewed the following pertinent data at today's visit:    Imaging:   ? XRAY:  ? Ultrasound:  ? CT Scan:  ? MRI Scan:  ? PET/CT Scan:    I personally reviewed the following images:    Summary of Outside Records:      Assessment and Plan:  Barney Kowalski is a 49 y.o. male with     Laryngopharyngeal reflux (LPR)  -     omeprazole (PRILOSEC) 40 MG capsule; Take 1 capsule (40 mg total) by mouth 2 (two) times daily  before meals.  Dispense: 30 capsule; Refill: 1     Recommend increasing PPI to BID for the next 4-6 weeks and really follow reflux precautions. Will recheck him at that time. He seems to be improving and I do not see anything suspicious but he does have that small apparent tonsillith of his lingual tonsil I will rescope next time      MD Jeremias Russellsflaquita [facility]

## 2019-11-08 NOTE — PROCEDURES
Flexible Fiberoptic Laryngoscopy    Indications: Symptoms concerning for LPR including post-nasal drip, frequent throat clearing, cough, globus sensation    Anesthesia: Topical xylocaine with venecia-synephrine    Complications: None    Findings: moderate interarytenoid edema and erythema    Procedure in Detail: After verbal consent obtained and risks, benefits and alternatives were discussed with the patient, nares were decongested and anesthetized, and flexible fiberoptic laryngoscope passed via right nare with following results:  - Nasal cavity: slight left septal deviation, no inferior turbinate hypertrophy, no mass or lesion  - Nasopharynx: no adenoid hypertrophy, no mass or lesion  - Oropharynx: no lingual tonsil asymmetry, has what almost looks like a tonsolith in his lingual tonsils on the right but no ulceration or mass   - Hypopharynx: no mass or lesion  - Supraglottis: no mass or lesion, moderate interarytenoid edema and erythema  - Glottis: bilateral true vocal cords with normal mobility, no edema, no mass or lesion    Patient tolerated the procedure well

## 2019-12-18 ENCOUNTER — PATIENT OUTREACH (OUTPATIENT)
Dept: ADMINISTRATIVE | Facility: OTHER | Age: 49
End: 2019-12-18

## 2020-04-27 ENCOUNTER — LAB VISIT (OUTPATIENT)
Dept: INTERNAL MEDICINE | Facility: CLINIC | Age: 50
End: 2020-04-27
Payer: COMMERCIAL

## 2020-04-27 ENCOUNTER — TELEPHONE (OUTPATIENT)
Dept: FAMILY MEDICINE | Facility: CLINIC | Age: 50
End: 2020-04-27

## 2020-04-27 DIAGNOSIS — R50.9 FEVER, UNSPECIFIED FEVER CAUSE: ICD-10-CM

## 2020-04-27 DIAGNOSIS — R50.9 FEVER, UNSPECIFIED FEVER CAUSE: Primary | ICD-10-CM

## 2020-04-27 LAB — SARS-COV-2 RNA RESP QL NAA+PROBE: DETECTED

## 2020-04-27 PROCEDURE — U0002 COVID-19 LAB TEST NON-CDC: HCPCS

## 2020-04-27 NOTE — TELEPHONE ENCOUNTER
----- Message from Verónica Garcia sent at 4/27/2020 11:18 AM CDT -----  Contact: Patient Wife  Wife called to see about getting orders for  to be tested for COVID-19 Patient was seen by the on-call physician Sunday thru Telechat and was told orders for testing would be in place for today and nothing has been scheduled.    Please call [phone]

## 2020-04-27 NOTE — TELEPHONE ENCOUNTER
----- Message from Verónica Garcia sent at 4/27/2020 11:18 AM CDT -----  Contact: Patient Wife  Wife called to see about getting orders for  to be tested for COVID-19 Patient was seen by the on-call physician Sunday thru Telechat and was told orders for testing would be in place for today and nothing has been scheduled.    Please call [phone]

## 2020-04-27 NOTE — TELEPHONE ENCOUNTER
Spoke with patient wife states patient did a telemedicine on yesterday the provider was supposed to placed a order for COVID testing no orders placed right now patient temp is 99.9, having headaches, drip in throat she him quarantine in the bedroom she requesting a order for him to be tested for COVID.

## 2020-04-28 DIAGNOSIS — U07.1 COVID-19 VIRUS DETECTED: ICD-10-CM

## 2020-04-30 ENCOUNTER — PATIENT MESSAGE (OUTPATIENT)
Dept: RESEARCH | Facility: HOSPITAL | Age: 50
End: 2020-04-30

## 2020-05-01 ENCOUNTER — RESEARCH ENCOUNTER (OUTPATIENT)
Dept: RESEARCH | Facility: HOSPITAL | Age: 50
End: 2020-05-01

## 2020-05-01 ENCOUNTER — PATIENT MESSAGE (OUTPATIENT)
Dept: RESEARCH | Facility: HOSPITAL | Age: 50
End: 2020-05-01

## 2020-07-02 ENCOUNTER — PATIENT OUTREACH (OUTPATIENT)
Dept: ADMINISTRATIVE | Facility: OTHER | Age: 50
End: 2020-07-02

## 2020-10-04 ENCOUNTER — PATIENT OUTREACH (OUTPATIENT)
Dept: ADMINISTRATIVE | Facility: OTHER | Age: 50
End: 2020-10-04

## 2021-05-06 ENCOUNTER — PATIENT MESSAGE (OUTPATIENT)
Dept: RESEARCH | Facility: HOSPITAL | Age: 51
End: 2021-05-06

## 2022-03-14 ENCOUNTER — OFFICE VISIT (OUTPATIENT)
Dept: GASTROENTEROLOGY | Facility: CLINIC | Age: 52
End: 2022-03-14
Payer: COMMERCIAL

## 2022-03-14 VITALS
HEIGHT: 74 IN | WEIGHT: 221.19 LBS | HEART RATE: 46 BPM | BODY MASS INDEX: 28.39 KG/M2 | DIASTOLIC BLOOD PRESSURE: 85 MMHG | SYSTOLIC BLOOD PRESSURE: 130 MMHG

## 2022-03-14 DIAGNOSIS — K59.04 CHRONIC IDIOPATHIC CONSTIPATION: ICD-10-CM

## 2022-03-14 DIAGNOSIS — K62.89 RECTAL PAIN: Primary | ICD-10-CM

## 2022-03-14 PROBLEM — I10 ESSENTIAL (PRIMARY) HYPERTENSION: Status: ACTIVE | Noted: 2020-04-26

## 2022-03-14 PROCEDURE — 99999 PR PBB SHADOW E&M-EST. PATIENT-LVL IV: ICD-10-PCS | Mod: PBBFAC,,, | Performed by: NURSE PRACTITIONER

## 2022-03-14 PROCEDURE — 99203 PR OFFICE/OUTPT VISIT, NEW, LEVL III, 30-44 MIN: ICD-10-PCS | Mod: S$GLB,,, | Performed by: NURSE PRACTITIONER

## 2022-03-14 PROCEDURE — 1159F MED LIST DOCD IN RCRD: CPT | Mod: CPTII,S$GLB,, | Performed by: NURSE PRACTITIONER

## 2022-03-14 PROCEDURE — 3079F DIAST BP 80-89 MM HG: CPT | Mod: CPTII,S$GLB,, | Performed by: NURSE PRACTITIONER

## 2022-03-14 PROCEDURE — 3075F PR MOST RECENT SYSTOLIC BLOOD PRESS GE 130-139MM HG: ICD-10-PCS | Mod: CPTII,S$GLB,, | Performed by: NURSE PRACTITIONER

## 2022-03-14 PROCEDURE — 1159F PR MEDICATION LIST DOCUMENTED IN MEDICAL RECORD: ICD-10-PCS | Mod: CPTII,S$GLB,, | Performed by: NURSE PRACTITIONER

## 2022-03-14 PROCEDURE — 3008F BODY MASS INDEX DOCD: CPT | Mod: CPTII,S$GLB,, | Performed by: NURSE PRACTITIONER

## 2022-03-14 PROCEDURE — 99203 OFFICE O/P NEW LOW 30 MIN: CPT | Mod: S$GLB,,, | Performed by: NURSE PRACTITIONER

## 2022-03-14 PROCEDURE — 1160F RVW MEDS BY RX/DR IN RCRD: CPT | Mod: CPTII,S$GLB,, | Performed by: NURSE PRACTITIONER

## 2022-03-14 PROCEDURE — 3075F SYST BP GE 130 - 139MM HG: CPT | Mod: CPTII,S$GLB,, | Performed by: NURSE PRACTITIONER

## 2022-03-14 PROCEDURE — 99999 PR PBB SHADOW E&M-EST. PATIENT-LVL IV: CPT | Mod: PBBFAC,,, | Performed by: NURSE PRACTITIONER

## 2022-03-14 PROCEDURE — 1160F PR REVIEW ALL MEDS BY PRESCRIBER/CLIN PHARMACIST DOCUMENTED: ICD-10-PCS | Mod: CPTII,S$GLB,, | Performed by: NURSE PRACTITIONER

## 2022-03-14 PROCEDURE — 3008F PR BODY MASS INDEX (BMI) DOCUMENTED: ICD-10-PCS | Mod: CPTII,S$GLB,, | Performed by: NURSE PRACTITIONER

## 2022-03-14 PROCEDURE — 3079F PR MOST RECENT DIASTOLIC BLOOD PRESSURE 80-89 MM HG: ICD-10-PCS | Mod: CPTII,S$GLB,, | Performed by: NURSE PRACTITIONER

## 2022-03-14 RX ORDER — CITALOPRAM 10 MG/1
10 TABLET ORAL DAILY
COMMUNITY
Start: 2021-01-01

## 2022-03-14 RX ORDER — SODIUM, POTASSIUM,MAG SULFATES 17.5-3.13G
1 SOLUTION, RECONSTITUTED, ORAL ORAL DAILY
Qty: 1 KIT | Refills: 0 | Status: SHIPPED | OUTPATIENT
Start: 2022-03-14 | End: 2022-03-16

## 2022-03-14 NOTE — PROGRESS NOTES
Subjective:       Patient ID: Barney Kowalski is a 51 y.o. male.    Chief Complaint: Constipation    50 y/o male presents to clinic with c/o rectal pain and constipation. Patient reports BM every 2-3 days with occasional hard stool. Noticed change in BM after starting Keto diet. Has rectal pain with hematochezia after BM. Feels multiple bulges in rectum with BM. Denies hx of hemorrhoids. No abdominal pain, unintentional weight loss, fever, fatigue or night sweats. No FH colon polyps or cancer. Per patient colonoscopy in 2014 at non-[facility] facility for diverticulosis was normal.       Past Medical History:   Diagnosis Date    Anxiety     Anxiety and depression 8/1/2016    Depression     Diverticulitis of colon     GERD (gastroesophageal reflux disease)     History of alcohol dependence     Sober since 2009 - alcohol addiction - did 60 day inpatient services       Past Surgical History:   Procedure Laterality Date    COLONOSCOPY  10/29/2014    DIVERTICULOSIS SIGMOID COLON; INTERNAL HEMORRHOIDS; OTHERWISE NORMAL.  REPEAT IN 10 YEARS    TONSILLECTOMY         Family History   Problem Relation Age of Onset    Hypertension Mother     No Known Problems Father     Breast cancer Maternal Grandmother 63    Hypertension Sister        Social History     Socioeconomic History    Marital status:    Occupational History     Employer: [employer]   Tobacco Use    Smoking status: Never Smoker    Smokeless tobacco: Never Used   Substance and Sexual Activity    Alcohol use: No     Alcohol/week: 0.0 standard drinks    Drug use: No       Review of Systems   Constitutional: Negative for appetite change and unexpected weight change.   HENT: Negative for trouble swallowing.    Respiratory: Negative for shortness of breath.    Gastrointestinal: Positive for blood in stool, constipation and rectal pain. Negative for abdominal pain.   Genitourinary: Negative for dysuria.   Neurological: Negative for dizziness and weakness.  "  Hematological: Negative for adenopathy. Does not bruise/bleed easily.   Psychiatric/Behavioral: Negative for dysphoric mood.         Objective:     Vitals:    03/14/22 1025   BP: 130/85   Pulse: (!) 46   Weight: 100.3 kg (221 lb 3.2 oz)   Height: 6' 2" (1.88 m)          Physical Exam  Exam conducted with a chaperone present.   Constitutional:       General: He is not in acute distress.     Appearance: Normal appearance. He is not ill-appearing.   HENT:      Head: Normocephalic.   Eyes:      Conjunctiva/sclera: Conjunctivae normal.      Pupils: Pupils are equal, round, and reactive to light.   Pulmonary:      Effort: Pulmonary effort is normal. No respiratory distress.   Genitourinary:     Rectum: No tenderness or external hemorrhoid. Normal anal tone.   Musculoskeletal:         General: Normal range of motion.      Cervical back: Normal range of motion.   Skin:     General: Skin is warm and dry.   Neurological:      Mental Status: He is alert and oriented to person, place, and time.   Psychiatric:         Mood and Affect: Mood normal.         Behavior: Behavior normal.               Assessment:         ICD-10-CM ICD-9-CM   1. Rectal pain  K62.89 569.42   2. Chronic idiopathic constipation  K59.04 564.00       Plan:       Rectal pain  -     SUPREP BOWEL PREP KIT 17.5-3.13-1.6 gram SolR; Take 177 mLs by mouth once daily. for 2 days  Dispense: 1 kit; Refill: 0  -     Case Request Endoscopy: COLONOSCOPY    Chronic idiopathic constipation        -      Start Miralax 17 g daily    Follow up if symptoms worsen or fail to improve.     Patient's Medications   New Prescriptions    SUPREP BOWEL PREP KIT 17.5-3.13-1.6 GRAM SOLR    Take 177 mLs by mouth once daily. for 2 days   Previous Medications    CITALOPRAM (CELEXA) 10 MG TABLET        MELOXICAM (MOBIC) 15 MG TABLET        OMEPRAZOLE (PRILOSEC) 40 MG CAPSULE    Take 1 capsule (40 mg total) by mouth 2 (two) times daily before meals.   Modified Medications    No medications " on file   Discontinued Medications    SERTRALINE (ZOLOFT) 25 MG TABLET        SERTRALINE (ZOLOFT) 50 MG TABLET    1 tablet once daily.    TEKTURNA 300 MG TAB    Take 150 mg by mouth once daily.

## 2022-03-14 NOTE — PATIENT INSTRUCTIONS
- Take Miralax 1 capful daily (generic is fine)    SUPREP Instructions    You are scheduled for a colonoscopy with Dr. Canales on 3/23/2022 at [facility] in [place], [place].   To ensure that your test is accurate and complete, you MUST follow these instructions listed below.  If you have any questions, please call our office at [phone].  Plan on being at the hospital for your procedure for 3-4 hours.    1.  Follow a CLEAR LIQUID DIET for the entire day before your scheduled colonoscopy.  This means no solid food the entire day starting when you wake.  You may have as much of the clear liquids as you want throughout the day.   CLEAR LIQUID DIET:   - Avoid Red, Orange, Purple, and/or Blue food coloring   - NO DAIRY   - You can have:  Coffee with sugar (no creamer), tea, water, soda, apple or white grape juice, chicken or beef broth/bouillon (no meat, noodles, or veggies), green/yellow popsicles, green/yellow Jell-O, lemonade.    2.  AT 5 pm the evening before your colonoscopy, POUR ONE (1) BOTTLE OF SUPREP INTO THE MIXING CONTAINER, PROVIDED INSIDE THE BOX.  ADD WATER TO THE LINE ON THE CONTAINER AND MIX IT WELL.  DRINK THE ENTIRE CONTAINER AND THEN DRINK TWO (2) MORE CONTAINERS OF WATER OVER THE NEXT 1 HOUR.  This is sometimes easier to drink if this solution is cold, so you can mix the solution 20 minutes ahead of time and place in the refrigerator prior to drinking.  You have to drink the solution within 30-45 minutes of mixing it.  Do NOT put this solution over ice.  It IS ok to drink with a straw.    3.  The endoscopy department will call you 1 day before your colonoscopy to tell you the exact time to arrive, AND to tell you the exact time to drink the 2nd portion of your prep (which will be FIVE HOURS BEFORE YOUR ARRIVAL TIME).  At this time given to you, POUR ONE (1) BOTTLE OF SUPREP INTO THE MIXING CONTAINER, PROVIDED INSIDE THE BOX.  ADD WATER TO THE LINE ON THE CONTAINER AND MIX IT WELL.  DRINK THE  ENTIRE CONTAINER AND THEN DRINK TWO (2) MORE CONTAINERS OF WATER OVER THE NEXT 1 HOUR.  This is sometimes easier to drink if this solution is cold, so you can mix the solution 20 minutes ahead of time and place in the refrigerator prior to drinking.  You have to drink the solution within 30-45 minutes of mixing it.  Do NOT put this solution over ice.  It IS ok to drink with a straw.  Once this is complete, you may not have ANYTHING else by mouth!    4.  You must have someone with you to DRIVE YOU HOME since you will be receiving IV sedation for the colonoscopy.    5.  It is ok to take MOST of your REGULAR MEDICATIONS  in the morning of your test with a SIP of water.  THE ONLY MEDS YOU NEED TO HOLD ARE YOUR DIABETES MEDICATIONS,  SOME BLOOD PRESSURE MEDS, AND BLOOD THINNERS IF OK'D BY YOUR DOCTOR.  Do NOT have anything else to eat or drink the morning of your colonoscopy.  It is ok to brush your teeth.    6.  If you are on blood thinners THAT YOU HAVE BEEN INSTRUCTED TO HOLD BY YOUR DOCTOR FOR THIS PROCEDURE, then do NOT take this the morning of your colonoscopy.  Do NOT stop these medications on your own, they must be approved to be held by your doctor.  Your colonoscopy can NOT be done if you are on these medications.  Examples of blood thinners include: Coumadin, Aggrenox, Plavix, Pradaxa, Reapro, Pletal, Xarelto, Ticagrelor, Brilinta, Eliquis, and high dose aspirin (325 mg).  You do not have to stop baby aspirin 81 mg.    7.  IF YOU ARE DIABETIC:  NO INSULIN OR ORAL MEDICATIONS THE MORNING OF THE COLONOSCOPY.  TAKE ONLY HALF THE DOSE OF YOUR INSULIN THE DAY BEFORE THE COLONOSCOPY.  DO NOT TAKE ANY ORAL DIABETIC MEDICATIONS THE DAY BEFORE THE COLONOSCOPY.  IF YOU ARE AN INSULIN DEPENDENT DIABETIC WITH UNSTABLE BLOOD SUGARS, NOTIFY YOUR PRIMARY CARE PHYSICIAN FOR INSTRUCTIONS.

## 2023-03-07 ENCOUNTER — TELEPHONE (OUTPATIENT)
Dept: FAMILY MEDICINE | Facility: CLINIC | Age: 53
End: 2023-03-07

## 2023-03-07 NOTE — TELEPHONE ENCOUNTER
Appointment Request   Barney MENDOZA Kowalski   Sent:   4:40 PM   To: [facility]      Barney Kowalski   MRN: [id number] : 1970   Pt Home: [phone]     Entered: [phone]        Message    Appointment Request From: Barney Kowalski      With Provider: Radha Padilla NP [[place] [facility]]      Preferred Date Range: 3/10/2023 - 3/10/2023      Preferred Times: Friday Morning      Reason for visit: Annual Check-up      Comments:   Blood work and annual checkup. Pain in left side.

## 2023-03-09 ENCOUNTER — PATIENT MESSAGE (OUTPATIENT)
Dept: FAMILY MEDICINE | Facility: CLINIC | Age: 53
End: 2023-03-09

## 2023-03-09 ENCOUNTER — OFFICE VISIT (OUTPATIENT)
Dept: FAMILY MEDICINE | Facility: CLINIC | Age: 53
End: 2023-03-09
Payer: COMMERCIAL

## 2023-03-09 DIAGNOSIS — K57.92 DIVERTICULITIS: ICD-10-CM

## 2023-03-09 DIAGNOSIS — R10.32 LLQ ABDOMINAL PAIN: Primary | ICD-10-CM

## 2023-03-09 PROCEDURE — 3008F BODY MASS INDEX DOCD: CPT | Mod: CPTII,95,,

## 2023-03-09 PROCEDURE — 1160F RVW MEDS BY RX/DR IN RCRD: CPT | Mod: CPTII,95,,

## 2023-03-09 PROCEDURE — 3008F PR BODY MASS INDEX (BMI) DOCUMENTED: ICD-10-PCS | Mod: CPTII,95,,

## 2023-03-09 PROCEDURE — 99214 OFFICE O/P EST MOD 30 MIN: CPT | Mod: 95,,,

## 2023-03-09 PROCEDURE — 99214 PR OFFICE/OUTPT VISIT, EST, LEVL IV, 30-39 MIN: ICD-10-PCS | Mod: 95,,,

## 2023-03-09 PROCEDURE — 1160F PR REVIEW ALL MEDS BY PRESCRIBER/CLIN PHARMACIST DOCUMENTED: ICD-10-PCS | Mod: CPTII,95,,

## 2023-03-09 PROCEDURE — 1159F PR MEDICATION LIST DOCUMENTED IN MEDICAL RECORD: ICD-10-PCS | Mod: CPTII,95,,

## 2023-03-09 PROCEDURE — 1159F MED LIST DOCD IN RCRD: CPT | Mod: CPTII,95,,

## 2023-03-09 NOTE — PROGRESS NOTES
The patient location is: [place]  The chief complaint leading to consultation is: left side pain  Visit type: audiovisual     Face to Face time with patient: 22 minutes  30 minutes of total time spent on the encounter, which includes face to face time and non-face to face time preparing to see the patient (eg, review of tests), Obtaining and/or reviewing separately obtained history, Documenting clinical information in the electronic or other health record, Independently interpreting results (not separately reported) and communicating results to the patient/family/caregiver, or Care coordination (not separately reported).      Each patient to whom he or she provides medical services by telemedicine is:  (1) informed of the relationship between the physician and patient and the respective role of any other health care provider with respect to management of the patient; and (2) notified that he or she may decline to receive medical services by telemedicine and may withdraw from such care at any time.   Subjective:         Chief Complaint: Flank Pain     Barney Kowalski is a 52 y.o. male, patient of Radha Padilla, NP unknown to me, presents today with complaints of Flank Pain    Mr. Gregory complaints of LLQ pain x 1 month. Discomfort started while he was on vacations. Since, the problem has been waxing and waning. Describes discomfort as achy. Reports history of diverticulitis in the past, with similar symptoms. Last colonoscopy on 03/23/2022 - see findings below. Patient on Miralax and fiber. Denies fever, chills, body aches, fatigue, abdominal tenderness, nausea, vomiting, diarrhea, constipations. Denies weight loss. Denies changes to his medications.     Abdominal Pain  This is a recurrent problem. The current episode started 1 to 4 weeks ago. The onset quality is gradual. The problem occurs daily. The most recent episode lasted 1 month. The problem has been waxing and waning. The pain is located in the LUQ  and LLQ. The pain is at a severity of 2/10. The pain is mild. The quality of the pain is aching and dull. The abdominal pain radiates to the LLQ. Pertinent negatives include no anorexia, arthralgias, belching, constipation, diarrhea, dysuria, fever, flatus, frequency, headaches, hematochezia, hematuria, melena, myalgias, nausea, vomiting or weight loss. The pain is aggravated by movement. The pain is relieved by Certain positions. He has tried antibiotics for the symptoms. The treatment provided mild relief. Prior diagnostic workup includes lower endoscopy. His past medical history is significant for GERD.     Colonoscopy:  Findings:        The perianal and digital rectal examinations were normal.        Multiple diverticula were found in the entire colon. There was        narrowing of the colon in association with the diverticular opening.        Internal hemorrhoids were found during retroflexion and during        anoscopy. The hemorrhoids were medium-sized and Grade II (internal        hemorrhoids that prolapse but reduce spontaneously).   Impression:            - Severe diverticulosis in the entire examined                          colon. There was narrowing of the colon in                          association with the diverticular opening.                          - Internal hemorrhoids.                          - No specimens collected.   Recommendation:        - Discharge patient to home.                          - Patient has a contact number available for                          emergencies. The signs and symptoms of potential                          delayed complications were discussed with the                          patient. Return to normal activities tomorrow.                          Written discharge instructions were provided to                          the patient.                          - High fiber diet. Add a fiber supplement, taken                          daily.                          -  Miralax daily to keep stools soft.                          - Continue present medications.                          - Repeat colonoscopy in 10 years for screening                          purposes.                          - Return to referring physician as previously                          scheduled.                          - Refer to a colo-rectal surgeon if symptoms                          persist.   MD Liza Greer MD   3/23/2022 1:51:01 PM     Past Medical History:   Diagnosis Date    Anxiety     Anxiety and depression 8/1/2016    Depression     Diverticulitis of colon     GERD (gastroesophageal reflux disease)     History of alcohol dependence     Sober since 2009 - alcohol addiction - did 60 day inpatient services       Past Surgical History:   Procedure Laterality Date    COLONOSCOPY  10/29/2014    DIVERTICULOSIS SIGMOID COLON; INTERNAL HEMORRHOIDS; OTHERWISE NORMAL.  REPEAT IN 10 YEARS    COLONOSCOPY N/A 3/23/2022    Procedure: COLONOSCOPY;  Surgeon: Liza Canales MD;  Location: [facility];  Service: Endoscopy;  Laterality: N/A;    TONSILLECTOMY         Family History   Problem Relation Age of Onset    Hypertension Mother     No Known Problems Father     Breast cancer Maternal Grandmother 63    Hypertension Sister        Social History     Socioeconomic History    Marital status:    Occupational History     Employer: [employer]   Tobacco Use    Smoking status: Never    Smokeless tobacco: Never   Substance and Sexual Activity    Alcohol use: No     Alcohol/week: 0.0 standard drinks    Drug use: No       Review of Systems   Constitutional:  Negative for activity change, appetite change, chills, fatigue, fever, unexpected weight change and weight loss.   Cardiovascular:  Negative for chest pain and leg swelling.   Gastrointestinal:  Positive for abdominal pain. Negative for anal bleeding, anorexia, blood in stool, constipation, diarrhea, flatus, hematochezia, melena, nausea,  "rectal pain and vomiting.   Endocrine: Negative for polydipsia, polyphagia and polyuria.   Genitourinary:  Negative for decreased urine volume, difficulty urinating, dysuria, flank pain, frequency and hematuria.   Musculoskeletal:  Negative for arthralgias, back pain and myalgias.   Skin:  Negative for color change.   Neurological:  Negative for dizziness, weakness and headaches.   Hematological:  Negative for adenopathy.   Psychiatric/Behavioral:  Negative for agitation.    All other systems reviewed and are negative.      Objective:     Vitals:    03/10/23 1655   Weight: 97.5 kg (215 lb)   Height: 6' 2" (1.88 m)          Physical Exam  Constitutional:       General: He is not in acute distress.     Appearance: Normal appearance. He is well-developed and well-groomed.   Skin:     Coloration: Skin is not pale.   Neurological:      Mental Status: He is alert and oriented to person, place, and time.   Psychiatric:         Mood and Affect: Mood normal.         Speech: Speech normal.         Behavior: Behavior normal. Behavior is cooperative.         Thought Content: Thought content normal.       Physical exam limited d/t virtual visit. Patient does not appear to be in any respiratory distress. Able to speak in complete sentences without becoming short of breath.       Assessment:         ICD-10-CM ICD-9-CM   1. LLQ abdominal pain  R10.32 789.04   2. Diverticulitis  K57.92 562.11       Plan:       LLQ abdominal pain  -     US Abdomen Limited; Future; Expected date: 03/09/2023    Diverticulitis  -     amoxicillin-clavulanate 875-125mg (AUGMENTIN) 875-125 mg per tablet; Take 1 tablet by mouth every 12 (twelve) hours. for 7 days  Dispense: 14 tablet; Refill: 0      Eat a balanced diet.  Do not delay having a bowel movement. Go as soon as you have the urge.   Do not strain or force your bowel movements.  Drink 8 to 10 glasses of water each day.  Follow-up with PCP in two weeks if symptoms do not improve.  Strict ER " precautions given.Red flags discussed with patient in detail.Patient voiced understanding and in agreement with current treatment plan.       If symptoms worsen, go to ER.  If symptoms do not improve, return to clinic.   Keep appointments with all specialists.   Follow up in about 2 weeks (around 3/23/2023), or if symptoms worsen or fail to improve.     Patient's Medications   New Prescriptions    AMOXICILLIN-CLAVULANATE 875-125MG (AUGMENTIN) 875-125 MG PER TABLET    Take 1 tablet by mouth every 12 (twelve) hours. for 7 days   Previous Medications    CITALOPRAM (CELEXA) 10 MG TABLET    Take 10 mg by mouth once daily.    IBUPROFEN 200 MG CAP    Take by mouth as needed.    OMEPRAZOLE (PRILOSEC) 40 MG CAPSULE    Take 1 capsule (40 mg total) by mouth 2 (two) times daily before meals.   Modified Medications    No medications on file   Discontinued Medications    No medications on file       Penny Alexander NP    Answers submitted by the patient for this visit:  Abdominal Pain Questionnaire (Submitted on 3/9/2023)  Chief Complaint: Abdominal pain  Chronicity: recurrent  Onset: 1 to 4 weeks ago  Onset quality: gradual  Frequency: daily  Episode duration: 1 Hours  Pain location: LUQ  Pain - numeric: 2/10  Pain quality: aching, dull  Radiates to: LLQ  arthralgias: Yes  myalgias: Yes  Aggravated by: movement  Relieved by: certain positions  Diagnostic workup: lower endoscopy  Pain severity: mild  Treatments tried: antibiotics  Improvement on treatment: mild  GERD: Yes

## 2023-03-10 VITALS — BODY MASS INDEX: 27.59 KG/M2 | HEIGHT: 74 IN | WEIGHT: 215 LBS

## 2023-03-10 RX ORDER — AMOXICILLIN AND CLAVULANATE POTASSIUM 875; 125 MG/1; MG/1
1 TABLET, FILM COATED ORAL EVERY 12 HOURS
Qty: 14 TABLET | Refills: 0 | Status: SHIPPED | OUTPATIENT
Start: 2023-03-10 | End: 2023-03-17

## 2023-03-29 ENCOUNTER — PATIENT MESSAGE (OUTPATIENT)
Dept: FAMILY MEDICINE | Facility: CLINIC | Age: 53
End: 2023-03-29
Payer: COMMERCIAL

## 2023-03-30 ENCOUNTER — PATIENT MESSAGE (OUTPATIENT)
Dept: ADMINISTRATIVE | Facility: OTHER | Age: 53
End: 2023-03-30
Payer: COMMERCIAL

## 2023-03-30 ENCOUNTER — OFFICE VISIT (OUTPATIENT)
Dept: CARDIOLOGY | Facility: CLINIC | Age: 53
End: 2023-03-30
Payer: COMMERCIAL

## 2023-03-30 VITALS
WEIGHT: 217.81 LBS | OXYGEN SATURATION: 97 % | BODY MASS INDEX: 27.95 KG/M2 | HEART RATE: 55 BPM | HEIGHT: 74 IN | SYSTOLIC BLOOD PRESSURE: 137 MMHG | DIASTOLIC BLOOD PRESSURE: 84 MMHG

## 2023-03-30 DIAGNOSIS — Z13.6 ENCOUNTER FOR SCREENING FOR CARDIOVASCULAR DISORDERS: ICD-10-CM

## 2023-03-30 DIAGNOSIS — I10 ESSENTIAL (PRIMARY) HYPERTENSION: Primary | ICD-10-CM

## 2023-03-30 PROCEDURE — 99204 OFFICE O/P NEW MOD 45 MIN: CPT | Mod: S$GLB,,, | Performed by: INTERNAL MEDICINE

## 2023-03-30 PROCEDURE — 3079F DIAST BP 80-89 MM HG: CPT | Mod: CPTII,S$GLB,, | Performed by: INTERNAL MEDICINE

## 2023-03-30 PROCEDURE — 3008F BODY MASS INDEX DOCD: CPT | Mod: CPTII,S$GLB,, | Performed by: INTERNAL MEDICINE

## 2023-03-30 PROCEDURE — 99999 PR PBB SHADOW E&M-EST. PATIENT-LVL III: ICD-10-PCS | Mod: PBBFAC,,, | Performed by: INTERNAL MEDICINE

## 2023-03-30 PROCEDURE — 3075F SYST BP GE 130 - 139MM HG: CPT | Mod: CPTII,S$GLB,, | Performed by: INTERNAL MEDICINE

## 2023-03-30 PROCEDURE — 3008F PR BODY MASS INDEX (BMI) DOCUMENTED: ICD-10-PCS | Mod: CPTII,S$GLB,, | Performed by: INTERNAL MEDICINE

## 2023-03-30 PROCEDURE — 3079F PR MOST RECENT DIASTOLIC BLOOD PRESSURE 80-89 MM HG: ICD-10-PCS | Mod: CPTII,S$GLB,, | Performed by: INTERNAL MEDICINE

## 2023-03-30 PROCEDURE — 99999 PR PBB SHADOW E&M-EST. PATIENT-LVL III: CPT | Mod: PBBFAC,,, | Performed by: INTERNAL MEDICINE

## 2023-03-30 PROCEDURE — 99204 PR OFFICE/OUTPT VISIT, NEW, LEVL IV, 45-59 MIN: ICD-10-PCS | Mod: S$GLB,,, | Performed by: INTERNAL MEDICINE

## 2023-03-30 PROCEDURE — 1159F PR MEDICATION LIST DOCUMENTED IN MEDICAL RECORD: ICD-10-PCS | Mod: CPTII,S$GLB,, | Performed by: INTERNAL MEDICINE

## 2023-03-30 PROCEDURE — 1159F MED LIST DOCD IN RCRD: CPT | Mod: CPTII,S$GLB,, | Performed by: INTERNAL MEDICINE

## 2023-03-30 PROCEDURE — 3075F PR MOST RECENT SYSTOLIC BLOOD PRESS GE 130-139MM HG: ICD-10-PCS | Mod: CPTII,S$GLB,, | Performed by: INTERNAL MEDICINE

## 2023-03-30 NOTE — PROGRESS NOTES
Subjective:   @Patient ID:  Barney Kowalski is a 52 y.o. male who presents for evaluation of HTN       HPI:   Here for initial evaluation   High blood pressure  He used to follow with Dr. Tyrone Macias in the past   He is accompanied by his wife  Pleasant 52 year old gentleman with hx of HTN. He lost significant and was taken off the BP medication. BP has been running good. Lately he was under a lot of stress and has been drinking too much coffee. Yesterday he didn't feel well. Went to the ER. BP was elevated.     This morning he feels well. BP is well controlled  Occasional palpitations when he gest stressed out     EKG yesterday Sinus bradycardia ( chronic) non specific st changes         Prior cardiovascular  Hx  --------------------------------    - Exercise stress test 2019 -ve for ischemia. Stayed 12 mins and 45 secs on treadmill       Patient Active Problem List    Diagnosis Date Noted    Rectal pain 2022    Chronic idiopathic constipation 2022    Essential (primary) hypertension 2020    History of alcohol dependence     GERD (gastroesophageal reflux disease)            Right Arm BP - Sittin/84  Left Arm BP - Sittin/85        LAST HbA1c  Lab Results   Component Value Date    HGBA1C 5.6 2019       Lipid panel  Lab Results   Component Value Date    CHOL 170 2019    CHOL 194 2016    CHOL 208 (H) 2015     Lab Results   Component Value Date    HDL 57 2019    HDL 53 2016    HDL 43 2015     Lab Results   Component Value Date    LDLCALC 103.2 2019    LDLCALC 125.6 2016    LDLCALC 153.2 2015     Lab Results   Component Value Date    TRIG 49 2019    TRIG 77 2016    TRIG 59 2015     Lab Results   Component Value Date    CHOLHDL 33.5 2019    CHOLHDL 27.3 2016    CHOLHDL 20.7 2015            Review of Systems   Constitutional: Negative for chills and fever.   HENT:  Negative for hearing loss  and nosebleeds.    Eyes:  Negative for blurred vision.   Cardiovascular:         As in HPI    Respiratory:  Negative for hemoptysis and shortness of breath.    Hematologic/Lymphatic: Negative for bleeding problem.   Skin:  Negative for itching.   Musculoskeletal:  Negative for falls.   Gastrointestinal:  Negative for abdominal pain and hematochezia.   Genitourinary:  Negative for hematuria.   Neurological:  Negative for dizziness and loss of balance.   Psychiatric/Behavioral:  Negative for altered mental status and depression.      Objective:   Physical Exam  Constitutional:       Appearance: He is well-developed.   HENT:      Head: Normocephalic and atraumatic.   Eyes:      Conjunctiva/sclera: Conjunctivae normal.   Neck:      Vascular: No carotid bruit or JVD.   Cardiovascular:      Rate and Rhythm: Regular rhythm. Bradycardia present.      Pulses:           Carotid pulses are 2+ on the right side and 2+ on the left side.       Radial pulses are 2+ on the right side and 2+ on the left side.      Heart sounds: Normal heart sounds. No murmur heard.    No friction rub. No gallop.   Pulmonary:      Effort: Pulmonary effort is normal. No respiratory distress.      Breath sounds: Normal breath sounds. No stridor. No wheezing.   Musculoskeletal:      Cervical back: Neck supple.   Skin:     General: Skin is warm and dry.   Neurological:      Mental Status: He is alert and oriented to person, place, and time.   Psychiatric:         Behavior: Behavior normal.       Assessment:     1. Essential (primary) hypertension    2. Encounter for screening for cardiovascular disorders        Plan:   - BP is doing well today   - Will enroll in digital HTN clinic   - Will check Echo   - will check CAC score   - Low salt diet     I spent 5-10 minutes asking, assessing, assisting, arranging and advising heart healthy diet improvements. This included low-salt meals, portion control and health food alternatives. I also encourage 30 minutes  of moderate exercise 3-4x a week.      Pertinent cardiac images and EKG reviewed independently.    Continue with current medical plan and lifestyle changes.  Return sooner for concerns or questions. If symptoms persist go to the ED  I have reviewed all pertinent data including patient's medical history in detail and updated the computerized patient record.     Orders Placed This Encounter   Procedures    CT Cardiac Scoring     Standing Status:   Future     Standing Expiration Date:   3/30/2024     Order Specific Question:   May the Radiologist modify the order per protocol to meet the clinical needs of the patient?     Answer:   Yes    Hypertension Digital Medicine (HDMP) Enrollment Order     I. PURPOSE  To provide [facility] patients with innovative, specialized blood pressure monitoring and optimal dosing of antihypertensive therapy to improve health outcomes and decrease microvascular and macrovascular complications    II. GOALS  To maintain a systematic, coordinated and cost-effective process to monitor blood pressure in patients with hypertension  To attain and maintain optimal antihypertensive therapy while ensuring patient safety using the most recent evidence-based guidelines for the management of hypertension as reported by AHA/ACC in 2017.   Provide consultative services to providers, patients and caregivers regarding optimal antihypertensive therapy  To improve patient/caregiver understanding and compliance related to antihypertensive therapies by providing continuous patient and caregiver education about their prescribed medications and associated disease state  To provide education, guidance and reinforcement regarding lifestyle modifications including weight loss, adopting and maintaining the Dietary Approaches to Stop Hypertension or DASH diet, sodium restriction, physical activity, and moderation of alcohol consumption to patients and caregivers  Allow providers increased availability of  clinic time for direct patient care   To provide patient care and education within an interdisciplinary framework and enhance partnering with other members of health care team  Improve continuity of care for high blood pressure patients and improve patient engagement  Collect and utilize pharmacy related outcomes to improve quality of patient care    III. COLLABORATIVE PRACTICE AGREEMENT    A.  Under this collaborative practice agreement, an [facility] pharmacist according to and in compliance with [place] Board of Pharmacy Title 46, Part LIII, section 523 and [place] Board of Medical Examiners definition of the Collaborative Drug Therapy Management (CDTM) may initiate, implement, alter and monitor a therapeutic drug plan intended to manage antihypertensive therapy.  Services offered by the hypertension pharmacy specialist may include education on disease state and lifestyle modification, in addition to the drug therapy services listed above. Written and audio/visual educational materials and patient specific information may be provided to improve quality of care.    B. Primary Collaborating Physician:    Primary Physician: Yaw Rolle M.D., [facility], JEANNA  , Cardiology  License number: [id number]  CDTM number:  [id number]  Telephone number: [phone]   E-mail address: [email or web address]  Emergency Contact Information: [phone]    Back-Up Physician:  Red Pearson M.D.  Cardiology  License number:  [id number]  CDTM number:  [id number]  Telephone number:  [phone]  E-mail address: [email or web address]  Emergency Contact Information: [phone]    C.  Pharmacists:   Each of the following pharmacists will serve as the primary pharmacist on CDTM and any pharmacist may serve as the secondary pharmacist for another pharmacists agreement.  Each of the pharmacists listed below has a Pharm.D degree, with completion of an accredited pharmacy practice residency and as well as experience with  managing patients along with a physician.  The outpatient monitoring service will be provided under the Cardiology Department at [facility] [place] location in [place] at [address] [place] [address]. The pharmacist will contact patients via telephone from a remote location.    Pharmacist: Rosalie Feliciano PharmD  This pharmacist has completed a pharmacy practice residency and has practiced clinical pharmacy for 7 years. She has experience in the areas of cardiology, acute care, and managed care. She started a pharmacist run hypertension clinic at [facility] during her residency and was published in The American Journal of Health-System Pharmacists.     [place] Pharmacist License Number: [id number]  CDTM number:  [id number]  Contact Number:  [phone]  Contact E-mail: [email or web address]  Emergency Contact Number:  [phone]    Pharmacist:  Amy Feng PharmD  This pharmacist has completed a pharmacy practice residency and has practiced clinical pharmacy for 17 years in the areas of cardiology, geriatric medicine, anticoagulation management, retail and ambulatory care.  She served as a pharmacy practice clinical preceptor and lead pharmacist in anticoagulation clinic for 10 years.  [place] Pharmacist License # [id number]  CDTM number:  [id number]  Contact Number:  [phone]  Contact E-mail:  [email or web address]   Emergency Contact Number:  [phone]    Pharmacist: Denise Choe PharmD, BCACP, CDE  This pharmacist has completed a pharmacy practice residency with emphasis in ambulatory care and has practiced clinical pharmacy for 8 years in the areas of dyslipidemia, hypertension, diabetes, and anticoagulation. She is also a Certified Diabetes Educator.      [place] Pharmacist License # [id number]  CDTM number: [id number]  Contact number: [phone]  Contact E-mail:  [email or web address]  Emergency Contact Number:  [phone]    Pharmacist: Allyssa Canada PharmD  This pharmacist started practicing at [facility] in 2011 following her one year residency at [facility]. She serves as an Adjunct Clinical  in the College of Pharmacy at [employer]. She served as the lead pharmacist for the Coumadin Clinic team for 4 years.   [place] Pharmacist License: [id number]  CDTM number: [id number]  Contact number: [phone]  Contact E-mail: [email or web address]   Emergency Contact Number: [phone]    Pharmacist:  Gaby Forman PharmD  This pharmacist has completed a pharmacy practice residency (PGY-1) and has practiced clinical pharmacy for 16  years in the areas of heart and abdominal transplant, retail and ambulatory care.  She was directly involved in the care of congestive heart failure, left ventricular assist device and heart transplant patients from 5615-5009.  She is currently practicing as a digital medicine clinical specialist in heart failure.    [place] Pharmacist License # [id number]  CDTM number:  [id number]  Contact Number:  [phone]  Contact E-mail:  [email or web address]   Emergency Contact Number:  [phone]    Pharmacist: Allyssa Joseph PharmD  This pharmacist obtained her Pharm.D. from [employer], and has completed an Ambulatory Care Pharmacy Practice residency at [employer]. She has practiced clinical pharmacy for 9  years and has experience in the areas of Hypertension and Diabetes.      [place] Pharmacist License: [id number]  CDTM Number: [id number]  Telephone number: [phone]  E-mail: [email or web address]  Emergency Contact Number: [phone]      Pharmacist: Susy Real PharmD  This pharmacist has completed a pharmacy practice residency with an emphasis in ambulatory care and has practiced clinical pharmacy for one year. She has experience in the areas of diabetes, hypertension and  dyslipidemia.   [place] Pharmacist License: [id number]  CDTM Number: [id number]  Contact Number: [phone]  Contact Email: [email or web address]   Emergency Contact: [phone]    Pharmacist: Lori Gregory, PharmD, BCPS  This pharmacist has completed a pharmacy practice residency with an emphasis in ambulatory care and is a Board Certified Pharmacotherapy Specialist (BCPS). She has practiced clinical pharmacy for  years in areas of ambulatory care, internal medicine, cardiology and specialty pharmacy.    [place] Pharmacist License Number: [id number]  CDTM number:  [id number]  Contact Number:  [phone]  Contact E-mail:  [email or web address]   Emergency Contact Number:  [phone]    D.  Eligible Patients  Patients whose antihypertensive therapy is managed under this agreement must have a diagnosis of hypertension as documented in the patient record, and have established care with a provider within [facility]. All aspects of the patients hypertension medication management will be followed in collaboration with the physician treating the patients hypertension.  The patient will be seen by his or her physician per their discretion or by the recommendation by the hypertension pharmacist.  The patients case may be reviewed with clinic medical personnel as needed, but will be reported at least every 30 days to the collaborating physician regarding the patients drug therapy management. All decisions made by the pharmacist will be recorded in [facility] and are readily available for physician review. All issues outside of the scope of antihypertensive therapy shall be reported to the collaborating physician.     E.  Medications in CDTM  This collaborative practice proposal involves the management of patients who are receiving antihypertensive therapy, specifically, thiazide-type diuretics, angiotensin-converting enzyme inhibtors (ACE-I), angiotensin receptor blockers  (ARB), calcium channel blockers (CCB), beta-blockers, loop diuretics, potassium-sparing diuretics, potassium supplementation, aldosterone antagonists, direct renin inhibitors, alpha-1 receptor blockers, central alpha-2 agonists, direct arterial vasodilators and peripheral adrenergic antoagonists, or those patients in which hypertension is controlled by lifestyle modifcation alone.      F.  Clinical Procedure  All patients will be notified that a hypertension CDTM exists.  A signed physician order will be required for each patient to be enrolled into the hypertension CDTM.  Informed consent and an electronic signature will be obtained from each patient and documented in the patients record.  This patient signature will be valid for 1 year, requiring a new physician order and patient consent yearly.  The patient must also be enrolled in the electornic communication program ([facility]) to be elegible for the monitoring program.  The following management plan will be utilized for CDTM:    Patients must submit blood pressures at a minimum of once weekly from the patient- purchased wireless blood pressure cuff. Patients who fail to comply with this requirement are subject to removal from [facility].   Evaluate the change in blood pressure, if any, from previous measurements performed on the same cuff and arm.  Determine and document contributing factors for blood pressure change.    Review initial drug therapy made by clinic physician upon program enrollment with patient to ensure compliance.    Identify target blood pressure based on age and comorbidities. Therapeutic changes will be made in collaboration with PharmD and collaborating physician based on the AHA/ACC 2017 guidelines for the treatment of hypertension.  Guide drug optimization based on patient response at 2-4 week intervals until control is achieved.  The PharmD will continue to monitor blood pressure and make adjustments to hypertension medications in order to  achieve optimal blood pressure.   Order follow up labs when changing or adding ACE inhibitors and diuretics.    Refer to hypertension specialist if  blood pressure goals cannot be achieved using the noted algorithm.  Notify physician with specific problems or request clinic visit if deemed necessary.  Document antihypertensive therapy changes, interventions, and outcomes in EMR.   Provide patient/caregiver continuous education or reinforcement regarding hypertension management,  medications and lifestyle modifications.    Laboratory Tests  Pharmacists will be authorized to order and evaluate laboratory tests directly related to the disease specific drug therapy being managed. Pharmacists will also be authorized to order additional specific labs based on patient clinical presentation or description. Pharmacists may also review other lab data available in the patient record which may be necessary for the evaluation and assessment of the impact on antihypertensive therapy (i.e. drug interaction, disease interaction, etc.).     b.  Documentation   Documentation of patient encounters and lab results will be permanently placed in the [facility].  Laboratory results will automatically populate prompting review and assessment of each patient.  Laboratory results obtained from outside laboratories will be entered into EMR manually.  This documentation will include lab values, medication changes, identification and assessment of adverse events related to therapy, antihypertensive medication dose adjustments, therapeutic management plan and follow up as well as any other information given to the patient during the telemedicine visit.    c.     1.   will be carried out through quarterly reports tracking following statistics:   a. Percent of patients requiring a change to antihypertensive medications   b. Number of emergency visits due to hypertensive urgency or emergency, hypotension or  medication side effects for participating patients  Percent of patients who are controlled (BP <130/80 mmHg)  and uncontrolled (BP>130 mmHg)     2.  Patient specific quality indicators will be identified through quarterly review of the following data:   a.  Average change in blood pressure after a dose adjument by the hypertension pharmacist    3.  A random sample of patient records shall be reviewed by the primary physician quarterly to ensure adherence by the hypertension clinical specialists to the collaborative practice agreement.     4.   measures will be reported to Pharmacy & Therapeutics Committee yearly.     References:    CHASE Crump, et al. 2017. 2017. Guidelines for the Prevention, Detection, Evaluation and Management of High Blood Pressure in Adults.      Order Specific Question:   BP Control Goal     Answer:   Current (2017) AHA Guidelines    Echo     Standing Status:   Future     Standing Expiration Date:   3/30/2024     Order Specific Question:   Release to patient     Answer:   Immediate       Follow up as scheduled.     He expressed verbal understanding and agreed with the plan    Patient's Medications   New Prescriptions    No medications on file   Previous Medications    CITALOPRAM (CELEXA) 10 MG TABLET    Take 10 mg by mouth once daily.    IBUPROFEN 200 MG CAP    Take by mouth as needed.    OMEPRAZOLE (PRILOSEC) 40 MG CAPSULE    Take 1 capsule (40 mg total) by mouth 2 (two) times daily before meals.   Modified Medications    No medications on file   Discontinued Medications    No medications on file

## 2023-04-03 ENCOUNTER — HOSPITAL ENCOUNTER (OUTPATIENT)
Dept: RADIOLOGY | Facility: HOSPITAL | Age: 53
Discharge: HOME | End: 2023-04-03
Attending: INTERNAL MEDICINE
Payer: COMMERCIAL

## 2023-04-03 DIAGNOSIS — Z13.6 ENCOUNTER FOR SCREENING FOR CARDIOVASCULAR DISORDERS: ICD-10-CM

## 2023-04-03 PROCEDURE — 75571 CT HRT W/O DYE W/CA TEST: CPT | Mod: 26,,, | Performed by: RADIOLOGY

## 2023-04-03 PROCEDURE — 75571 CT HRT W/O DYE W/CA TEST: CPT | Mod: TC

## 2023-04-03 PROCEDURE — 75571 CT CALCIUM SCORING CARDIAC: ICD-10-PCS | Mod: 26,,, | Performed by: RADIOLOGY

## 2023-04-04 ENCOUNTER — PATIENT MESSAGE (OUTPATIENT)
Dept: CARDIOLOGY | Facility: CLINIC | Age: 53
End: 2023-04-04
Payer: COMMERCIAL

## 2023-04-05 ENCOUNTER — OFFICE VISIT (OUTPATIENT)
Dept: CARDIOLOGY | Facility: CLINIC | Age: 53
End: 2023-04-05
Payer: COMMERCIAL

## 2023-04-05 VITALS
OXYGEN SATURATION: 96 % | HEART RATE: 72 BPM | BODY MASS INDEX: 27.45 KG/M2 | DIASTOLIC BLOOD PRESSURE: 80 MMHG | WEIGHT: 213.88 LBS | HEIGHT: 74 IN | SYSTOLIC BLOOD PRESSURE: 130 MMHG

## 2023-04-05 DIAGNOSIS — R07.89 OTHER CHEST PAIN: ICD-10-CM

## 2023-04-05 DIAGNOSIS — R93.1 AGATSTON CORONARY ARTERY CALCIUM SCORE GREATER THAN 400: Primary | Chronic | ICD-10-CM

## 2023-04-05 DIAGNOSIS — I10 ESSENTIAL (PRIMARY) HYPERTENSION: ICD-10-CM

## 2023-04-05 DIAGNOSIS — E78.2 MIXED HYPERLIPIDEMIA: ICD-10-CM

## 2023-04-05 PROCEDURE — 99999 PR PBB SHADOW E&M-EST. PATIENT-LVL III: ICD-10-PCS | Mod: PBBFAC,,, | Performed by: INTERNAL MEDICINE

## 2023-04-05 PROCEDURE — 99999 PR PBB SHADOW E&M-EST. PATIENT-LVL III: CPT | Mod: PBBFAC,,, | Performed by: INTERNAL MEDICINE

## 2023-04-05 PROCEDURE — 99214 OFFICE O/P EST MOD 30 MIN: CPT | Mod: S$GLB,,, | Performed by: INTERNAL MEDICINE

## 2023-04-05 PROCEDURE — 99213 OFFICE O/P EST LOW 20 MIN: CPT | Mod: PBBFAC,PO | Performed by: INTERNAL MEDICINE

## 2023-04-05 PROCEDURE — 99214 PR OFFICE/OUTPT VISIT, EST, LEVL IV, 30-39 MIN: ICD-10-PCS | Mod: S$GLB,,, | Performed by: INTERNAL MEDICINE

## 2023-04-05 RX ORDER — AA/PROT/LYSINE/METHIO/VIT C/B6 50-12.5 MG
10 TABLET ORAL DAILY
Qty: 30 CAPSULE | Refills: 11 | Status: SHIPPED | OUTPATIENT
Start: 2023-04-05 | End: 2023-09-05 | Stop reason: ALTCHOICE

## 2023-04-05 RX ORDER — ROSUVASTATIN CALCIUM 20 MG/1
20 TABLET, COATED ORAL DAILY
Qty: 30 TABLET | Refills: 11 | Status: SHIPPED | OUTPATIENT
Start: 2023-04-05 | End: 2023-05-05

## 2023-04-05 RX ORDER — ASPIRIN 81 MG/1
81 TABLET ORAL DAILY
Refills: 0 | COMMUNITY
Start: 2023-04-05 | End: 2024-04-04

## 2023-04-05 NOTE — PROGRESS NOTES
Subjective:   @Patient ID:  Barney Kowalski is a 52 y.o. male who presents for evaluation of HTN       HPI:   Here for follow up   Coronary artery calcium score is elevated  No chest pain, dyspnea on exertion  Blood pressure is well controlled  Occasional palpitation with stress    Historically  He used to follow with Dr. Tyrone Macias in the past   Pleasant 52 year old gentleman with hx of HTN. He lost significant and was taken off the BP medications. BP has been running good.     EKG yesterday Sinus bradycardia ( chronic) non specific st changes         Prior cardiovascular  Hx  --------------------------------    - Exercise stress test 4/26/2019 -ve for ischemia. Stayed 12 mins and 45 secs on treadmill       Patient Active Problem List    Diagnosis Date Noted    Agatston coronary artery calcium score greater than 400 04/05/2023    Rectal pain 03/14/2022    Chronic idiopathic constipation 03/14/2022    Essential (primary) hypertension 04/26/2020    History of alcohol dependence     GERD (gastroesophageal reflux disease)                     LAST HbA1c  Lab Results   Component Value Date    HGBA1C 5.6 04/26/2019       Lipid panel  Lab Results   Component Value Date    CHOL 170 04/26/2019    CHOL 194 03/18/2016    CHOL 208 (H) 02/05/2015     Lab Results   Component Value Date    HDL 57 04/26/2019    HDL 53 03/18/2016    HDL 43 02/05/2015     Lab Results   Component Value Date    LDLCALC 103.2 04/26/2019    LDLCALC 125.6 03/18/2016    LDLCALC 153.2 02/05/2015     Lab Results   Component Value Date    TRIG 49 04/26/2019    TRIG 77 03/18/2016    TRIG 59 02/05/2015     Lab Results   Component Value Date    CHOLHDL 33.5 04/26/2019    CHOLHDL 27.3 03/18/2016    CHOLHDL 20.7 02/05/2015            Review of Systems   Constitutional: Negative for chills and fever.   HENT:  Negative for hearing loss and nosebleeds.    Eyes:  Negative for blurred vision.   Cardiovascular:         As in HPI    Respiratory:  Negative for  hemoptysis and shortness of breath.    Hematologic/Lymphatic: Negative for bleeding problem.   Skin:  Negative for itching.   Musculoskeletal:  Negative for falls.   Gastrointestinal:  Negative for abdominal pain and hematochezia.   Genitourinary:  Negative for hematuria.   Neurological:  Negative for dizziness and loss of balance.   Psychiatric/Behavioral:  Negative for altered mental status and depression.      Objective:   Physical Exam  Constitutional:       Appearance: He is well-developed.   HENT:      Head: Normocephalic and atraumatic.   Eyes:      Conjunctiva/sclera: Conjunctivae normal.   Neck:      Vascular: No carotid bruit or JVD.   Cardiovascular:      Rate and Rhythm: Regular rhythm. Bradycardia present.      Pulses:           Carotid pulses are 2+ on the right side and 2+ on the left side.       Radial pulses are 2+ on the right side and 2+ on the left side.      Heart sounds: Normal heart sounds. No murmur heard.    No friction rub. No gallop.   Pulmonary:      Effort: Pulmonary effort is normal. No respiratory distress.      Breath sounds: Normal breath sounds. No stridor. No wheezing.   Musculoskeletal:      Cervical back: Neck supple.   Skin:     General: Skin is warm and dry.   Neurological:      Mental Status: He is alert and oriented to person, place, and time.   Psychiatric:         Behavior: Behavior normal.       Assessment:     1. Agatston coronary artery calcium score greater than 400    2. Essential (primary) hypertension    3. Other chest pain    4. Mixed hyperlipidemia        Plan:   - BP is doing well today   - Enrolled in digital HTN clinic   - Echo pending  - Stress MPI for ischemia evaluation.  Evidence of coronary artery disease by the cardiac CT  - start high-intensity statins and aspirin 81 mg daily  - risk factors medications  - Low salt diet  - lipid panel   I spent 5-10 minutes asking, assessing, assisting, arranging and advising heart healthy diet improvements. This included  low-salt meals, portion control and health food alternatives. I also encourage 30 minutes of moderate exercise 3-4x a week.      Pertinent cardiac images and EKG reviewed independently.    Continue with current medical plan and lifestyle changes.  Return sooner for concerns or questions. If symptoms persist go to the ED  I have reviewed all pertinent data including patient's medical history in detail and updated the computerized patient record.     Orders Placed This Encounter   Procedures    NM Myocardial Perfusion Spect Multi Exer     Standing Status:   Future     Standing Expiration Date:   4/5/2024     Order Specific Question:   May the Radiologist modify the order per protocol to meet the clinical needs of the patient?     Answer:   Yes     Order Specific Question:   Will a Cardiologist read this study?     Answer:   No    Lipid Panel     fasting     Standing Status:   Future     Standing Expiration Date:   4/5/2024    Nuclear Stress Test     Standing Status:   Future     Standing Expiration Date:   4/5/2024     Order Specific Question:   Which stress agent will be used?     Answer:   Exercise     Order Specific Question:   Release to patient     Answer:   Immediate       Follow up as scheduled.     He expressed verbal understanding and agreed with the plan    Patient's Medications   New Prescriptions    ASPIRIN (ECOTRIN) 81 MG EC TABLET    Take 1 tablet (81 mg total) by mouth once daily.    COENZYME Q10 10 MG CAPSULE    Take 10 mg by mouth once daily.    ROSUVASTATIN (CRESTOR) 20 MG TABLET    Take 1 tablet (20 mg total) by mouth once daily.   Previous Medications    CITALOPRAM (CELEXA) 10 MG TABLET    Take 10 mg by mouth once daily.    IBUPROFEN 200 MG CAP    Take by mouth as needed.    OMEPRAZOLE (PRILOSEC) 40 MG CAPSULE    Take 1 capsule (40 mg total) by mouth 2 (two) times daily before meals.   Modified Medications    No medications on file   Discontinued Medications    No medications on file

## 2023-04-10 ENCOUNTER — PATIENT MESSAGE (OUTPATIENT)
Dept: CARDIOLOGY | Facility: CLINIC | Age: 53
End: 2023-04-10

## 2023-04-10 ENCOUNTER — HOSPITAL ENCOUNTER (OUTPATIENT)
Dept: CARDIOLOGY | Facility: HOSPITAL | Age: 53
Discharge: HOME | End: 2023-04-10
Attending: INTERNAL MEDICINE
Payer: COMMERCIAL

## 2023-04-10 VITALS — BODY MASS INDEX: 27.34 KG/M2 | WEIGHT: 213 LBS | HEIGHT: 74 IN

## 2023-04-10 DIAGNOSIS — I10 ESSENTIAL (PRIMARY) HYPERTENSION: ICD-10-CM

## 2023-04-10 LAB
ASCENDING AORTA: 3.63 CM
AV INDEX (PROSTH): 0.78
AV MEAN GRADIENT: 5 MMHG
AV PEAK GRADIENT: 9 MMHG
AV VALVE AREA: 3.71 CM2
AV VELOCITY RATIO: 0.82
BSA FOR ECHO PROCEDURE: 2.25 M2
CV ECHO LV RWT: 0.39 CM
DOP CALC AO PEAK VEL: 1.46 M/S
DOP CALC AO VTI: 32.9 CM
DOP CALC LVOT AREA: 4.8 CM2
DOP CALC LVOT DIAMETER: 2.47 CM
DOP CALC LVOT PEAK VEL: 1.19 M/S
DOP CALC LVOT STROKE VOLUME: 122.12 CM3
DOP CALC MV VTI: 20.6 CM
DOP CALCLVOT PEAK VEL VTI: 25.5 CM
E WAVE DECELERATION TIME: 184.76 MSEC
E/A RATIO: 1.72
E/E' RATIO: 8.78 M/S
ECHO LV POSTERIOR WALL: 0.98 CM (ref 0.6–1.1)
EJECTION FRACTION: 60 %
FRACTIONAL SHORTENING: 33 % (ref 28–44)
INTERVENTRICULAR SEPTUM: 1.13 CM (ref 0.6–1.1)
IVC DIAMETER: 2.34 CM
LA MAJOR: 5.08 CM
LA MINOR: 4.6 CM
LA WIDTH: 4 CM
LEFT ATRIUM SIZE: 3.46 CM
LEFT ATRIUM VOLUME INDEX MOD: 20.8 ML/M2
LEFT ATRIUM VOLUME INDEX: 25.5 ML/M2
LEFT ATRIUM VOLUME MOD: 46.45 CM3
LEFT ATRIUM VOLUME: 56.8 CM3
LEFT INTERNAL DIMENSION IN SYSTOLE: 3.37 CM (ref 2.1–4)
LEFT VENTRICLE DIASTOLIC VOLUME INDEX: 54.3 ML/M2
LEFT VENTRICLE DIASTOLIC VOLUME: 121.08 ML
LEFT VENTRICLE MASS INDEX: 89 G/M2
LEFT VENTRICLE SYSTOLIC VOLUME INDEX: 20.8 ML/M2
LEFT VENTRICLE SYSTOLIC VOLUME: 46.29 ML
LEFT VENTRICULAR INTERNAL DIMENSION IN DIASTOLE: 5.05 CM (ref 3.5–6)
LEFT VENTRICULAR MASS: 198.84 G
LV LATERAL E/E' RATIO: 7.18 M/S
LV SEPTAL E/E' RATIO: 11.29 M/S
LVOT MG: 2.94 MMHG
LVOT MV: 0.81 CM/S
MV A" WAVE DURATION": 129.4 MSEC
MV MEAN GRADIENT: 1 MMHG
MV PEAK A VEL: 0.46 M/S
MV PEAK E VEL: 0.79 M/S
MV PEAK GRADIENT: 2 MMHG
MV STENOSIS PRESSURE HALF TIME: 53.58 MS
MV VALVE AREA BY CONTINUITY EQUATION: 5.93 CM2
MV VALVE AREA P 1/2 METHOD: 4.11 CM2
PISA TR MAX VEL: 1.74 M/S
PULM VEIN S/D RATIO: 0.69
PV MV: 0.73 M/S
PV PEAK D VEL: 0.62 M/S
PV PEAK S VEL: 0.43 M/S
PV PEAK VELOCITY: 1.09 CM/S
RA MAJOR: 5.24 CM
RA PRESSURE: 15 MMHG
RA WIDTH: 3.9 CM
RIGHT VENTRICULAR END-DIASTOLIC DIMENSION: 3.28 CM
RV TISSUE DOPPLER FREE WALL SYSTOLIC VELOCITY 1 (APICAL 4 CHAMBER VIEW): 0.01 CM/S
STJ: 3.31 CM
TDI LATERAL: 0.11 M/S
TDI SEPTAL: 0.07 M/S
TDI: 0.09 M/S
TR MAX PG: 12 MMHG
TV REST PULMONARY ARTERY PRESSURE: 27 MMHG

## 2023-04-10 PROCEDURE — 93306 TTE W/DOPPLER COMPLETE: CPT

## 2023-04-10 PROCEDURE — 93306 TTE W/DOPPLER COMPLETE: CPT | Mod: 26,,, | Performed by: INTERNAL MEDICINE

## 2023-04-10 PROCEDURE — 93306 ECHO (CUPID ONLY): ICD-10-PCS | Mod: 26,,, | Performed by: INTERNAL MEDICINE

## 2023-04-10 NOTE — PROGRESS NOTES
Mr. Kowalski, your echocardiogram results is good. No major issues. Normal heart pumping function.     Sincerely,  Owen Vergara MD.   Interventional Cardiologist  [facility], [place]

## 2023-04-11 NOTE — TELEPHONE ENCOUNTER
I have called and discussed the results.     Sincerely,  Owen Vergara MD.   Interventional Cardiologist  [facility], [place]

## 2023-04-17 ENCOUNTER — PATIENT MESSAGE (OUTPATIENT)
Dept: CARDIOLOGY | Facility: CLINIC | Age: 53
End: 2023-04-17
Payer: COMMERCIAL

## 2023-04-18 NOTE — TELEPHONE ENCOUNTER
I have reviewed the EKG. No major changes that we need to be concerned about. Will await the stress test result.     Sincerely,  Owen Vergara MD.   Interventional Cardiologist  [facility], [place]

## 2023-04-21 ENCOUNTER — HOSPITAL ENCOUNTER (OUTPATIENT)
Dept: RADIOLOGY | Facility: HOSPITAL | Age: 53
Discharge: HOME | End: 2023-04-21
Attending: INTERNAL MEDICINE
Payer: COMMERCIAL

## 2023-04-21 ENCOUNTER — HOSPITAL ENCOUNTER (OUTPATIENT)
Dept: CARDIOLOGY | Facility: HOSPITAL | Age: 53
Discharge: HOME | End: 2023-04-21
Attending: INTERNAL MEDICINE
Payer: COMMERCIAL

## 2023-04-21 VITALS — BODY MASS INDEX: 26.95 KG/M2 | WEIGHT: 210 LBS | HEIGHT: 74 IN

## 2023-04-21 DIAGNOSIS — R93.1 AGATSTON CORONARY ARTERY CALCIUM SCORE GREATER THAN 400: Chronic | ICD-10-CM

## 2023-04-21 DIAGNOSIS — R07.89 OTHER CHEST PAIN: ICD-10-CM

## 2023-04-21 DIAGNOSIS — R93.1 AGATSTON CORONARY ARTERY CALCIUM SCORE GREATER THAN 400: ICD-10-CM

## 2023-04-21 LAB
CV STRESS BASE HR: 44 BPM
DIASTOLIC BLOOD PRESSURE: 87 MMHG
OHS CV CPX 1 MINUTE RECOVERY HEART RATE: 136 BPM
OHS CV CPX 85 PERCENT MAX PREDICTED HEART RATE MALE: 145
OHS CV CPX ESTIMATED METS: 13
OHS CV CPX MAX PREDICTED HEART RATE: 170
OHS CV CPX PATIENT IS FEMALE: 0
OHS CV CPX PATIENT IS MALE: 1
OHS CV CPX PEAK DIASTOLIC BLOOD PRESSURE: 85 MMHG
OHS CV CPX PEAK HEAR RATE: 148 BPM
OHS CV CPX PEAK RATE PRESSURE PRODUCT: NORMAL
OHS CV CPX PEAK SYSTOLIC BLOOD PRESSURE: 187 MMHG
OHS CV CPX PERCENT MAX PREDICTED HEART RATE ACHIEVED: 87
OHS CV CPX RATE PRESSURE PRODUCT PRESENTING: 5984
STRESS ECHO POST EXERCISE DUR MIN: 10 MINUTES
STRESS ECHO POST EXERCISE DUR SEC: 45 SECONDS
SYSTOLIC BLOOD PRESSURE: 136 MMHG

## 2023-04-21 PROCEDURE — 93016 NUCLEAR STRESS TEST (CUPID ONLY): ICD-10-PCS | Mod: ,,, | Performed by: INTERNAL MEDICINE

## 2023-04-21 PROCEDURE — 78452 HT MUSCLE IMAGE SPECT MULT: CPT | Mod: 26,,, | Performed by: RADIOLOGY

## 2023-04-21 PROCEDURE — 93018 NUCLEAR STRESS TEST (CUPID ONLY): ICD-10-PCS | Mod: ,,, | Performed by: INTERNAL MEDICINE

## 2023-04-21 PROCEDURE — A9502 TC99M TETROFOSMIN: HCPCS

## 2023-04-21 PROCEDURE — 78452 HT MUSCLE IMAGE SPECT MULT: CPT | Mod: TC

## 2023-04-21 PROCEDURE — 93018 CV STRESS TEST I&R ONLY: CPT | Mod: ,,, | Performed by: INTERNAL MEDICINE

## 2023-04-21 PROCEDURE — 78452 NM MYOCARDIAL PERFUSION SPECT MULTI STUDY: ICD-10-PCS | Mod: 26,,, | Performed by: RADIOLOGY

## 2023-04-21 PROCEDURE — 93017 CV STRESS TEST TRACING ONLY: CPT

## 2023-04-21 PROCEDURE — 93016 CV STRESS TEST SUPVJ ONLY: CPT | Mod: ,,, | Performed by: INTERNAL MEDICINE

## 2023-04-24 ENCOUNTER — PATIENT MESSAGE (OUTPATIENT)
Dept: CARDIOLOGY | Facility: CLINIC | Age: 53
End: 2023-04-24
Payer: COMMERCIAL

## 2023-04-24 NOTE — PROGRESS NOTES
I have discussed the stress test results. He has runs of NSVT during peak exercise. He was asymptomatic. Given the high CAC score and runs of NSVT I have recommended coronary angiogram to delineate coronary anatomy and rule  high grade coronary stenosis.  Gven elevated CAC, CCTA will not be the ideal test.  Other options is to continue medical tx.   He will think about his options and get back to us.

## 2023-04-25 DIAGNOSIS — I47.29 NON-SUSTAINED VENTRICULAR TACHYCARDIA: ICD-10-CM

## 2023-04-25 DIAGNOSIS — R94.31 ABNORMAL ECG DURING EXERCISE STRESS TEST: Primary | ICD-10-CM

## 2023-04-25 DIAGNOSIS — R94.39 ABNORMAL STRESS TEST: ICD-10-CM

## 2023-04-25 DIAGNOSIS — I25.118 CORONARY ARTERY DISEASE INVOLVING NATIVE CORONARY ARTERY OF NATIVE HEART WITH OTHER FORM OF ANGINA PECTORIS: ICD-10-CM

## 2023-04-25 RX ORDER — SODIUM CHLORIDE 0.9 % (FLUSH) 0.9 %
10 SYRINGE (ML) INJECTION
Status: SHIPPED | OUTPATIENT
Start: 2023-04-25

## 2023-04-25 RX ORDER — DIPHENHYDRAMINE HCL 50 MG
50 CAPSULE ORAL ONCE
Status: CANCELLED | OUTPATIENT
Start: 2023-04-25 | End: 2023-04-25

## 2023-04-25 RX ORDER — SODIUM CHLORIDE 9 MG/ML
125 INJECTION, SOLUTION INTRAVENOUS CONTINUOUS
Status: CANCELLED | OUTPATIENT
Start: 2023-04-25 | End: 2023-04-25

## 2023-04-26 ENCOUNTER — OFFICE VISIT (OUTPATIENT)
Dept: CARDIOLOGY | Facility: CLINIC | Age: 53
End: 2023-04-26
Payer: COMMERCIAL

## 2023-04-26 VITALS
WEIGHT: 213.31 LBS | HEART RATE: 50 BPM | SYSTOLIC BLOOD PRESSURE: 121 MMHG | OXYGEN SATURATION: 98 % | HEIGHT: 74 IN | BODY MASS INDEX: 27.38 KG/M2 | DIASTOLIC BLOOD PRESSURE: 78 MMHG

## 2023-04-26 DIAGNOSIS — R93.1 AGATSTON CORONARY ARTERY CALCIUM SCORE GREATER THAN 400: Primary | Chronic | ICD-10-CM

## 2023-04-26 DIAGNOSIS — I10 ESSENTIAL (PRIMARY) HYPERTENSION: ICD-10-CM

## 2023-04-26 DIAGNOSIS — I47.29 NSVT (NONSUSTAINED VENTRICULAR TACHYCARDIA): ICD-10-CM

## 2023-04-26 DIAGNOSIS — R94.31 ABNORMAL ECG DURING EXERCISE STRESS TEST: ICD-10-CM

## 2023-04-26 PROCEDURE — 3074F SYST BP LT 130 MM HG: CPT | Mod: CPTII,S$GLB,, | Performed by: INTERNAL MEDICINE

## 2023-04-26 PROCEDURE — 1159F PR MEDICATION LIST DOCUMENTED IN MEDICAL RECORD: ICD-10-PCS | Mod: CPTII,S$GLB,, | Performed by: INTERNAL MEDICINE

## 2023-04-26 PROCEDURE — 3078F PR MOST RECENT DIASTOLIC BLOOD PRESSURE < 80 MM HG: ICD-10-PCS | Mod: CPTII,S$GLB,, | Performed by: INTERNAL MEDICINE

## 2023-04-26 PROCEDURE — 99999 PR PBB SHADOW E&M-EST. PATIENT-LVL III: ICD-10-PCS | Mod: PBBFAC,,, | Performed by: INTERNAL MEDICINE

## 2023-04-26 PROCEDURE — 3078F DIAST BP <80 MM HG: CPT | Mod: CPTII,S$GLB,, | Performed by: INTERNAL MEDICINE

## 2023-04-26 PROCEDURE — 99214 OFFICE O/P EST MOD 30 MIN: CPT | Mod: S$GLB,,, | Performed by: INTERNAL MEDICINE

## 2023-04-26 PROCEDURE — 3074F PR MOST RECENT SYSTOLIC BLOOD PRESSURE < 130 MM HG: ICD-10-PCS | Mod: CPTII,S$GLB,, | Performed by: INTERNAL MEDICINE

## 2023-04-26 PROCEDURE — 3008F PR BODY MASS INDEX (BMI) DOCUMENTED: ICD-10-PCS | Mod: CPTII,S$GLB,, | Performed by: INTERNAL MEDICINE

## 2023-04-26 PROCEDURE — 99999 PR PBB SHADOW E&M-EST. PATIENT-LVL III: CPT | Mod: PBBFAC,,, | Performed by: INTERNAL MEDICINE

## 2023-04-26 PROCEDURE — 99214 PR OFFICE/OUTPT VISIT, EST, LEVL IV, 30-39 MIN: ICD-10-PCS | Mod: S$GLB,,, | Performed by: INTERNAL MEDICINE

## 2023-04-26 PROCEDURE — 3008F BODY MASS INDEX DOCD: CPT | Mod: CPTII,S$GLB,, | Performed by: INTERNAL MEDICINE

## 2023-04-26 PROCEDURE — 1159F MED LIST DOCD IN RCRD: CPT | Mod: CPTII,S$GLB,, | Performed by: INTERNAL MEDICINE

## 2023-04-26 NOTE — PROGRESS NOTES
Subjective:   @Patient ID:  Barney Kowalski is a 52 y.o. male who presents for evaluation of HTN       HPI:   Here for follow up and to discuss the stress test results.   He did well on treadmill but he had frequent PVCs and runs of NSVT. Nuclear part was -ve for ischemia  Coronary artery calcium score is elevated  No chest pain, or significant dyspnea on exertion  Blood pressure is well controlled  Occasional palpitation with stress    Historically  He used to follow with Dr. Tyrone Macias in the past   Pleasant 52 year old gentleman with hx of HTN. He lost significant and was taken off the BP medications. BP has been running good.     EKG yesterday Sinus bradycardia ( chronic) non specific st changes         Prior cardiovascular  Hx  --------------------------------  - Stress MPI 2023    The ECG portion of the study is negative for ischemia.    The patient reported no chest pain during the stress test.    During stress, frequent PVCs are noted. During stress, 2 episodes of non-sustained VTs are noted.    The patient exercised for 10 minutes 45 seconds on a Cristian protocol, corresponding to a functional capacity of 13 METS, achieving a peak heart rate of 148 bpm, which is 87 % of the age predicted maximum heart rate. The patient experienced no angina during the test.     - Exercise stress test 2019 -ve for ischemia. Stayed 12 mins and 45 secs on treadmill       Patient Active Problem List    Diagnosis Date Noted    Abnormal ECG during exercise stress test 2023    NSVT (nonsustained ventricular tachycardia) 2023    Agatston coronary artery calcium score greater than 400 2023    Rectal pain 2022    Chronic idiopathic constipation 2022    Essential (primary) hypertension 2020    History of alcohol dependence     GERD (gastroesophageal reflux disease)            Right Arm BP - Sittin/78  Left Arm BP - Sittin/79        LAST HbA1c  Lab Results   Component Value  Date    HGBA1C 5.6 04/26/2019       Lipid panel  Lab Results   Component Value Date    CHOL 106 (L) 04/21/2023    CHOL 170 04/26/2019    CHOL 194 03/18/2016     Lab Results   Component Value Date    HDL 49 04/21/2023    HDL 57 04/26/2019    HDL 53 03/18/2016     Lab Results   Component Value Date    LDLCALC 49.4 (L) 04/21/2023    LDLCALC 103.2 04/26/2019    LDLCALC 125.6 03/18/2016     Lab Results   Component Value Date    TRIG 38 04/21/2023    TRIG 49 04/26/2019    TRIG 77 03/18/2016     Lab Results   Component Value Date    CHOLHDL 46.2 04/21/2023    CHOLHDL 33.5 04/26/2019    CHOLHDL 27.3 03/18/2016            Review of Systems   Constitutional: Negative for chills and fever.   HENT:  Negative for hearing loss and nosebleeds.    Eyes:  Negative for blurred vision.   Cardiovascular:         As in HPI    Respiratory:  Negative for hemoptysis and shortness of breath.    Hematologic/Lymphatic: Negative for bleeding problem.   Skin:  Negative for itching.   Musculoskeletal:  Negative for falls.   Gastrointestinal:  Negative for abdominal pain and hematochezia.   Genitourinary:  Negative for hematuria.   Neurological:  Negative for dizziness and loss of balance.   Psychiatric/Behavioral:  Negative for altered mental status and depression.      Objective:   Physical Exam  Constitutional:       Appearance: He is well-developed.   HENT:      Head: Normocephalic and atraumatic.   Eyes:      Conjunctiva/sclera: Conjunctivae normal.   Neck:      Vascular: No carotid bruit or JVD.   Cardiovascular:      Rate and Rhythm: Regular rhythm. Bradycardia present.      Pulses:           Carotid pulses are 2+ on the right side and 2+ on the left side.       Radial pulses are 2+ on the right side and 2+ on the left side.      Heart sounds: Normal heart sounds. No murmur heard.    No friction rub. No gallop.   Pulmonary:      Effort: Pulmonary effort is normal. No respiratory distress.      Breath sounds: Normal breath sounds. No  stridor. No wheezing.   Musculoskeletal:      Cervical back: Neck supple.   Skin:     General: Skin is warm and dry.   Neurological:      Mental Status: He is alert and oriented to person, place, and time.   Psychiatric:         Behavior: Behavior normal.       Assessment:     1. Agatston coronary artery calcium score greater than 400    2. Essential (primary) hypertension    3. Abnormal ECG during exercise stress test    4. NSVT (nonsustained ventricular tachycardia)        Plan:   - I had a thorough discussion with the patient and his wife.  Stress test results discussed in details.  MPI images negative for ischemia.  EKG without significant ischemia however he did have frequent PVCs and short runs of nonsustained ventricular tachycardia.  Options discussed including continued medical therapy and close observation, proceeding with coronary angiogram to delineate his coronary anatomy and exclude any high-grade stenosis.  He does have elevated coronary artery calcium score. Risks and benefits completely discussed.  His heart rate is bradycardic and and not able to tolerate beta blockers.    Given the elevated coronary artery calcium score proceeding with coronary CTA will be limited and most likely will get nondiagnostic study.    After shared decision we elected to proceed with coronary angiogram to exclude any high-grade stenosis.    - Continue high-intensity statins and aspirin 81 mg daily    - risk factors medications  - Low salt diet     I spent 5-10 minutes asking, assessing, assisting, arranging and advising heart healthy diet improvements. This included low-salt meals, portion control and health food alternatives. I also encourage 30 minutes of moderate exercise 3-4x a week.      Pertinent cardiac images and EKG reviewed independently.    Continue with current medical plan and lifestyle changes.  Return sooner for concerns or questions. If symptoms persist go to the ED  I have reviewed all pertinent data  including patient's medical history in detail and updated the computerized patient record.     No orders of the defined types were placed in this encounter.      Follow up as scheduled.     He expressed verbal understanding and agreed with the plan    Patient's Medications   New Prescriptions    No medications on file   Previous Medications    ASPIRIN (ECOTRIN) 81 MG EC TABLET    Take 1 tablet (81 mg total) by mouth once daily.    CITALOPRAM (CELEXA) 10 MG TABLET    Take 10 mg by mouth once daily.    COENZYME Q10 10 MG CAPSULE    Take 10 mg by mouth once daily.    IBUPROFEN 200 MG CAP    Take by mouth as needed.    OMEPRAZOLE (PRILOSEC) 40 MG CAPSULE    Take 1 capsule (40 mg total) by mouth 2 (two) times daily before meals.    ROSUVASTATIN (CRESTOR) 20 MG TABLET    Take 1 tablet (20 mg total) by mouth once daily.   Modified Medications    No medications on file   Discontinued Medications    No medications on file

## 2023-05-03 NOTE — PROGRESS NOTES
Subjective:   @Patient ID:  Barney Kowalski is a 52 y.o. male who presents for evaluation of HTN       HPI:   Here for follow up and to discuss the angiogram   No chest pain   Few night with night sweats     He did well on treadmill but he had frequent PVCs and runs of NSVT. Nuclear part was -ve for ischemia  Coronary artery calcium score is elevated  No chest pain, or significant dyspnea on exertion  Blood pressure is well controlled  Occasional palpitation with stress    Historically  He used to follow with Dr. Tyrone Macias in the past   Pleasant 52 year old gentleman with hx of HTN. He lost significant and was taken off the BP medications. BP has been running good.     EKG yesterday Sinus bradycardia ( chronic) non specific st changes         Prior cardiovascular  Hx  --------------------------------  - Stress MPI 4/21/2023    The ECG portion of the study is negative for ischemia.    The patient reported no chest pain during the stress test.    During stress, frequent PVCs are noted. During stress, 2 episodes of non-sustained VTs are noted.    The patient exercised for 10 minutes 45 seconds on a Cristian protocol, corresponding to a functional capacity of 13 METS, achieving a peak heart rate of 148 bpm, which is 87 % of the age predicted maximum heart rate. The patient experienced no angina during the test.     - Exercise stress test 4/26/2019 -ve for ischemia. Stayed 12 mins and 45 secs on treadmill       Patient Active Problem List    Diagnosis Date Noted    Abnormal ECG during exercise stress test 04/26/2023    NSVT (nonsustained ventricular tachycardia) 04/26/2023    Agatston coronary artery calcium score greater than 400 04/05/2023    Rectal pain 03/14/2022    Chronic idiopathic constipation 03/14/2022    Essential (primary) hypertension 04/26/2020    History of alcohol dependence     GERD (gastroesophageal reflux disease)                     LAST HbA1c  Lab Results   Component Value Date    HGBA1C 5.6  04/26/2019       Lipid panel  Lab Results   Component Value Date    CHOL 106 (L) 04/21/2023    CHOL 170 04/26/2019    CHOL 194 03/18/2016     Lab Results   Component Value Date    HDL 49 04/21/2023    HDL 57 04/26/2019    HDL 53 03/18/2016     Lab Results   Component Value Date    LDLCALC 49.4 (L) 04/21/2023    LDLCALC 103.2 04/26/2019    LDLCALC 125.6 03/18/2016     Lab Results   Component Value Date    TRIG 38 04/21/2023    TRIG 49 04/26/2019    TRIG 77 03/18/2016     Lab Results   Component Value Date    CHOLHDL 46.2 04/21/2023    CHOLHDL 33.5 04/26/2019    CHOLHDL 27.3 03/18/2016            Review of Systems   Constitutional: Negative for chills and fever.   HENT:  Negative for hearing loss and nosebleeds.    Eyes:  Negative for blurred vision.   Cardiovascular:         As in HPI    Respiratory:  Negative for hemoptysis and shortness of breath.    Hematologic/Lymphatic: Negative for bleeding problem.   Skin:  Negative for itching.   Musculoskeletal:  Negative for falls.   Gastrointestinal:  Negative for abdominal pain and hematochezia.   Genitourinary:  Negative for hematuria.   Neurological:  Negative for dizziness and loss of balance.   Psychiatric/Behavioral:  Negative for altered mental status and depression.      Objective:   Physical Exam  Constitutional:       Appearance: He is well-developed.   HENT:      Head: Normocephalic and atraumatic.   Eyes:      Conjunctiva/sclera: Conjunctivae normal.   Neck:      Vascular: No carotid bruit or JVD.   Cardiovascular:      Rate and Rhythm: Regular rhythm. Bradycardia present.      Pulses:           Carotid pulses are 2+ on the right side and 2+ on the left side.       Radial pulses are 2+ on the right side and 2+ on the left side.      Heart sounds: Normal heart sounds. No murmur heard.    No friction rub. No gallop.   Pulmonary:      Effort: Pulmonary effort is normal. No respiratory distress.      Breath sounds: Normal breath sounds. No stridor. No wheezing.    Musculoskeletal:      Cervical back: Neck supple.   Skin:     General: Skin is warm and dry.   Neurological:      Mental Status: He is alert and oriented to person, place, and time.   Psychiatric:         Behavior: Behavior normal.       Assessment:     1. Agatston coronary artery calcium score greater than 400    2. Essential (primary) hypertension    3. Abnormal ECG during exercise stress test    4. NSVT (nonsustained ventricular tachycardia)        Plan:   - I had a thorough discussion with the patient and his wife.  Stress test results discussed in details.  MPI images negative for ischemia.  EKG without significant ischemia however he did have frequent PVCs and short runs of nonsustained ventricular tachycardia.  Options discussed including continued medical therapy and close observation, proceeding with coronary angiogram to delineate his coronary anatomy and exclude any high-grade stenosis.  He does have elevated coronary artery calcium score. Risks and benefits completely discussed.  His heart rate is bradycardic and and not able to tolerate beta blockers.    Given the elevated coronary artery calcium score proceeding with coronary CTA will be limited and most likely will get nondiagnostic study.    After shared decision we elected to proceed with coronary angiogram to exclude any high-grade stenosis.    - Continue high-intensity statins and aspirin 81 mg daily    - risk factors medications  - Low salt diet     I spent 5-10 minutes asking, assessing, assisting, arranging and advising heart healthy diet improvements. This included low-salt meals, portion control and health food alternatives. I also encourage 30 minutes of moderate exercise 3-4x a week.      Pertinent cardiac images and EKG reviewed independently.    Continue with current medical plan and lifestyle changes.  Return sooner for concerns or questions. If symptoms persist go to the ED  I have reviewed all pertinent data including patient's  medical history in detail and updated the computerized patient record.     No orders of the defined types were placed in this encounter.      Follow up as scheduled.     He expressed verbal understanding and agreed with the plan    Patient's Medications   New Prescriptions    No medications on file   Previous Medications    ASPIRIN (ECOTRIN) 81 MG EC TABLET    Take 1 tablet (81 mg total) by mouth once daily.    CITALOPRAM (CELEXA) 10 MG TABLET    Take 10 mg by mouth once daily.    COENZYME Q10 10 MG CAPSULE    Take 10 mg by mouth once daily.    IBUPROFEN 200 MG CAP    Take by mouth as needed.    OMEPRAZOLE (PRILOSEC) 40 MG CAPSULE    Take 1 capsule (40 mg total) by mouth 2 (two) times daily before meals.    ROSUVASTATIN (CRESTOR) 20 MG TABLET    Take 1 tablet (20 mg total) by mouth once daily.   Modified Medications    No medications on file   Discontinued Medications    No medications on file

## 2023-05-04 ENCOUNTER — OFFICE VISIT (OUTPATIENT)
Dept: CARDIOLOGY | Facility: CLINIC | Age: 53
End: 2023-05-04
Payer: COMMERCIAL

## 2023-05-04 VITALS
BODY MASS INDEX: 26.82 KG/M2 | SYSTOLIC BLOOD PRESSURE: 120 MMHG | HEART RATE: 55 BPM | WEIGHT: 209 LBS | HEIGHT: 74 IN | DIASTOLIC BLOOD PRESSURE: 78 MMHG

## 2023-05-04 DIAGNOSIS — R93.1 AGATSTON CORONARY ARTERY CALCIUM SCORE GREATER THAN 400: Primary | Chronic | ICD-10-CM

## 2023-05-04 DIAGNOSIS — R94.31 ABNORMAL ECG DURING EXERCISE STRESS TEST: ICD-10-CM

## 2023-05-04 DIAGNOSIS — I10 ESSENTIAL (PRIMARY) HYPERTENSION: ICD-10-CM

## 2023-05-04 DIAGNOSIS — I47.29 NSVT (NONSUSTAINED VENTRICULAR TACHYCARDIA): ICD-10-CM

## 2023-05-04 PROCEDURE — 3074F PR MOST RECENT SYSTOLIC BLOOD PRESSURE < 130 MM HG: ICD-10-PCS | Mod: CPTII,S$GLB,, | Performed by: INTERNAL MEDICINE

## 2023-05-04 PROCEDURE — 3078F DIAST BP <80 MM HG: CPT | Mod: CPTII,S$GLB,, | Performed by: INTERNAL MEDICINE

## 2023-05-04 PROCEDURE — 99999 PR PBB SHADOW E&M-EST. PATIENT-LVL II: CPT | Mod: PBBFAC,,, | Performed by: INTERNAL MEDICINE

## 2023-05-04 PROCEDURE — 3074F SYST BP LT 130 MM HG: CPT | Mod: CPTII,S$GLB,, | Performed by: INTERNAL MEDICINE

## 2023-05-04 PROCEDURE — 99999 PR PBB SHADOW E&M-EST. PATIENT-LVL II: ICD-10-PCS | Mod: PBBFAC,,, | Performed by: INTERNAL MEDICINE

## 2023-05-04 PROCEDURE — 99213 PR OFFICE/OUTPT VISIT, EST, LEVL III, 20-29 MIN: ICD-10-PCS | Mod: S$GLB,,, | Performed by: INTERNAL MEDICINE

## 2023-05-04 PROCEDURE — 3008F PR BODY MASS INDEX (BMI) DOCUMENTED: ICD-10-PCS | Mod: CPTII,S$GLB,, | Performed by: INTERNAL MEDICINE

## 2023-05-04 PROCEDURE — 99213 OFFICE O/P EST LOW 20 MIN: CPT | Mod: S$GLB,,, | Performed by: INTERNAL MEDICINE

## 2023-05-04 PROCEDURE — 3008F BODY MASS INDEX DOCD: CPT | Mod: CPTII,S$GLB,, | Performed by: INTERNAL MEDICINE

## 2023-05-04 PROCEDURE — 3078F PR MOST RECENT DIASTOLIC BLOOD PRESSURE < 80 MM HG: ICD-10-PCS | Mod: CPTII,S$GLB,, | Performed by: INTERNAL MEDICINE

## 2023-05-05 ENCOUNTER — PATIENT MESSAGE (OUTPATIENT)
Dept: CARDIOLOGY | Facility: HOSPITAL | Age: 53
End: 2023-05-05
Payer: COMMERCIAL

## 2023-05-05 DIAGNOSIS — R93.1 AGATSTON CORONARY ARTERY CALCIUM SCORE GREATER THAN 400: Primary | Chronic | ICD-10-CM

## 2023-05-05 RX ORDER — PRAVASTATIN SODIUM 40 MG/1
40 TABLET ORAL DAILY
Qty: 90 TABLET | Refills: 3 | Status: SHIPPED | OUTPATIENT
Start: 2023-05-05 | End: 2023-09-05 | Stop reason: ALTCHOICE

## 2023-05-05 NOTE — TELEPHONE ENCOUNTER
Saúl Pinedo, It was pravastatin, not lipitor. I have sent the prescription.     Sincerely,  Owen Vergara MD.   Interventional Cardiologist  [facility], [place]

## 2023-05-08 ENCOUNTER — PATIENT MESSAGE (OUTPATIENT)
Dept: CARDIOLOGY | Facility: HOSPITAL | Age: 53
End: 2023-05-08
Payer: COMMERCIAL

## 2023-09-05 ENCOUNTER — TELEPHONE (OUTPATIENT)
Dept: FAMILY MEDICINE | Facility: CLINIC | Age: 53
End: 2023-09-05

## 2023-09-05 ENCOUNTER — OFFICE VISIT (OUTPATIENT)
Dept: FAMILY MEDICINE | Facility: CLINIC | Age: 53
End: 2023-09-05
Payer: COMMERCIAL

## 2023-09-05 DIAGNOSIS — R10.9 ACUTE LEFT FLANK PAIN: Primary | ICD-10-CM

## 2023-09-05 DIAGNOSIS — K59.04 CHRONIC IDIOPATHIC CONSTIPATION: ICD-10-CM

## 2023-09-05 DIAGNOSIS — R30.0 DYSURIA: ICD-10-CM

## 2023-09-05 DIAGNOSIS — R93.1 AGATSTON CORONARY ARTERY CALCIUM SCORE GREATER THAN 400: Chronic | ICD-10-CM

## 2023-09-05 DIAGNOSIS — K57.32 DIVERTICULITIS OF DESCENDING COLON: Primary | ICD-10-CM

## 2023-09-05 DIAGNOSIS — R10.32 LEFT LOWER QUADRANT ABDOMINAL PAIN: ICD-10-CM

## 2023-09-05 DIAGNOSIS — K21.9 GASTROESOPHAGEAL REFLUX DISEASE, UNSPECIFIED WHETHER ESOPHAGITIS PRESENT: Chronic | ICD-10-CM

## 2023-09-05 PROCEDURE — 1160F PR REVIEW ALL MEDS BY PRESCRIBER/CLIN PHARMACIST DOCUMENTED: ICD-10-PCS | Mod: CPTII,95,, | Performed by: FAMILY MEDICINE

## 2023-09-05 PROCEDURE — 99214 PR OFFICE/OUTPT VISIT, EST, LEVL IV, 30-39 MIN: ICD-10-PCS | Mod: 95,,, | Performed by: FAMILY MEDICINE

## 2023-09-05 PROCEDURE — 1159F MED LIST DOCD IN RCRD: CPT | Mod: CPTII,95,, | Performed by: FAMILY MEDICINE

## 2023-09-05 PROCEDURE — 99214 OFFICE O/P EST MOD 30 MIN: CPT | Mod: 95,,, | Performed by: FAMILY MEDICINE

## 2023-09-05 PROCEDURE — 1159F PR MEDICATION LIST DOCUMENTED IN MEDICAL RECORD: ICD-10-PCS | Mod: CPTII,95,, | Performed by: FAMILY MEDICINE

## 2023-09-05 PROCEDURE — 1160F RVW MEDS BY RX/DR IN RCRD: CPT | Mod: CPTII,95,, | Performed by: FAMILY MEDICINE

## 2023-09-05 RX ORDER — CIPROFLOXACIN 500 MG/1
500 TABLET ORAL EVERY 12 HOURS
Qty: 20 TABLET | Refills: 0 | Status: SHIPPED | OUTPATIENT
Start: 2023-09-05 | End: 2023-09-15

## 2023-09-05 RX ORDER — METRONIDAZOLE 500 MG/1
500 TABLET ORAL EVERY 8 HOURS
Qty: 30 TABLET | Refills: 0 | Status: SHIPPED | OUTPATIENT
Start: 2023-09-05 | End: 2023-09-15

## 2023-09-05 NOTE — PROGRESS NOTES
Office Visit    Patient Name: Barney Kowalski    : 1970  MRN: [id number]    Subjective:  Barney is a 53 y.o. male who presents today for:    No chief complaint on file.    The patient location is: HIS HOME OFFICE   The chief complaint leading to consultation is: LEFT FLANK PAIN, LEFT GROIN PAIN, H/O Dysuria     Visit type: audiovisual    Face to Face time with patient: START 1042 END 1152  27  minutes of total time spent on the encounter, which includes face to face time and non-face to face time preparing to see the patient (eg, review of tests), Obtaining and/or reviewing separately obtained history, Documenting clinical information in the electronic or other health record, Independently interpreting results (not separately reported) and communicating results to the patient/family/caregiver, or Care coordination (not separately reported).     Each patient to whom he or she provides medical services by telemedicine is:  (1) informed of the relationship between the physician and patient and the respective role of any other health care provider with respect to management of the patient; and (2) notified that he or she may decline to receive medical services by telemedicine and may withdraw from such care at any time.    Notes:     52 yo patient of NP Radha Padilla with H/O high coronary Calcium score, NSVT, HTN followed by cardiology and GERD on PPI +  h/o diverticulitis but No history of kidney stone, UTI, or prostatitis that he is aware of who presents for UC VV to discuss recent Left flank and groin pain that is worsening.      About 3 weeks ago started noticing some Left flank pain-- waking and waning, some correlation with certain twisting movements.   He is now experiencing some sharp pain wrapping around from the Left flank to his Left groin.     No hematuria, but he did have some brief burning with urination that seems to have have resolved with cranberry juice and hydration.     His pain is manageable  overall. Able to eat and drink.     Recently was experiencing constipation so took Miralax and now having some loose stools. No nausea/vomiting. No fevers/chills but some fatigue.     Labs from 4/2023 reviewed: normal liver/kidney function, normal H/H and WBC count. LDL 49.     PAST MEDICAL HISTORY, SURGICAL/SOCIAL/FAMILY HISTORY REVIEWED AS PER CHART, WITH PERTINENT FINDINGS INCLUDED IN HISTORY SECTION OF NOTE.     Current Medications    Medication List with Changes/Refills   Current Medications    ASPIRIN (ECOTRIN) 81 MG EC TABLET    Take 1 tablet (81 mg total) by mouth once daily.    CITALOPRAM (CELEXA) 10 MG TABLET    Take 10 mg by mouth once daily.    IBUPROFEN 200 MG CAP    Take by mouth as needed.    OMEPRAZOLE (PRILOSEC) 40 MG CAPSULE    Take 1 capsule (40 mg total) by mouth 2 (two) times daily before meals.   Discontinued Medications    COENZYME Q10 10 MG CAPSULE    Take 10 mg by mouth once daily.    PRAVASTATIN (PRAVACHOL) 40 MG TABLET    Take 1 tablet (40 mg total) by mouth once daily.       Allergies   Review of patient's allergies indicates:  No Known Allergies      Review of Systems (Pertinent positives)  Review of Systems   Constitutional:  Positive for fatigue. Negative for chills and fever.   Gastrointestinal:  Positive for abdominal pain and constipation (resolved with Miralax). Negative for diarrhea, nausea and vomiting.   Genitourinary:  Positive for dysuria and frequency. Negative for hematuria.   Musculoskeletal:  Positive for arthralgias and myalgias.   Neurological:  Negative for headaches.       There were no vitals taken for this visit.    Physical Exam  Vitals reviewed.   Constitutional:       General: He is not in acute distress.     Appearance: He is well-developed.   HENT:      Head: Normocephalic and atraumatic.   Eyes:      Conjunctiva/sclera: Conjunctivae normal.   Pulmonary:      Effort: Pulmonary effort is normal.   Skin:     General: Skin is warm.   Neurological:      Mental  Status: He is alert and oriented to person, place, and time.   Psychiatric:         Mood and Affect: Mood normal.         Behavior: Behavior normal.           Assessment/Plan:  Barney Kowalski is a 53 y.o. male who presents today for :        ICD-10-CM ICD-9-CM    1. Acute left flank pain  R10.9 789.09 CT Renal Stone Study ABD Pelvis WO     338.19 Urinalysis      Urine culture      Comprehensive Metabolic Panel      CBC Auto Differential      2. Left lower quadrant abdominal pain  R10.32 789.04 CT Renal Stone Study ABD Pelvis WO      3. Dysuria  R30.0 788.1 Urinalysis      Urine culture      4. Gastroesophageal reflux disease, unspecified whether esophagitis present  K21.9 530.81       5. Chronic idiopathic constipation  K59.04 564.00       6. Agatston coronary artery calcium score greater than 400  R93.1 793.2         Recent Acute Left Flank pain, colicky in nature that is now associated with sharp wrap around Left groin pain and intermittent dysuria with some malaise.   Presentation consistent with potential kidney stone and CT renal stone study ordered along with UA/ Culture and CBC/ CMP to eval for micro hematuria/ UTI/leukocytosis/ check kidney function.    Will advise accordingly based on labs/urine and CT scan results.    Appropriate for OP w/u as he is currently tolerating PO and pain is overall tolerable.     Sees a cardiologist for his heart cardiovascular conditions.     There are no Patient Instructions on file for this visit.      Follow up for to follow up on lab results, to review results of diagnostic procedure.

## 2023-11-09 ENCOUNTER — PATIENT MESSAGE (OUTPATIENT)
Dept: FAMILY MEDICINE | Facility: CLINIC | Age: 53
End: 2023-11-09
Payer: COMMERCIAL

## 2023-11-14 ENCOUNTER — OFFICE VISIT (OUTPATIENT)
Dept: GASTROENTEROLOGY | Facility: CLINIC | Age: 53
End: 2023-11-14
Payer: COMMERCIAL

## 2023-11-14 ENCOUNTER — TELEPHONE (OUTPATIENT)
Dept: GASTROENTEROLOGY | Facility: CLINIC | Age: 53
End: 2023-11-14

## 2023-11-14 DIAGNOSIS — R10.32 LEFT LOWER QUADRANT ABDOMINAL PAIN: Primary | ICD-10-CM

## 2023-11-14 DIAGNOSIS — K59.04 CHRONIC IDIOPATHIC CONSTIPATION: ICD-10-CM

## 2023-11-14 DIAGNOSIS — R10.9 LEFT FLANK PAIN: ICD-10-CM

## 2023-11-14 PROBLEM — K62.89 RECTAL PAIN: Status: RESOLVED | Noted: 2022-03-14 | Resolved: 2023-11-14

## 2023-11-14 PROCEDURE — 1160F PR REVIEW ALL MEDS BY PRESCRIBER/CLIN PHARMACIST DOCUMENTED: ICD-10-PCS | Mod: CPTII,95,, | Performed by: NURSE PRACTITIONER

## 2023-11-14 PROCEDURE — 1159F MED LIST DOCD IN RCRD: CPT | Mod: CPTII,95,, | Performed by: NURSE PRACTITIONER

## 2023-11-14 PROCEDURE — 99214 OFFICE O/P EST MOD 30 MIN: CPT | Mod: 95,,, | Performed by: NURSE PRACTITIONER

## 2023-11-14 PROCEDURE — 1160F RVW MEDS BY RX/DR IN RCRD: CPT | Mod: CPTII,95,, | Performed by: NURSE PRACTITIONER

## 2023-11-14 PROCEDURE — 1159F PR MEDICATION LIST DOCUMENTED IN MEDICAL RECORD: ICD-10-PCS | Mod: CPTII,95,, | Performed by: NURSE PRACTITIONER

## 2023-11-14 PROCEDURE — 99214 PR OFFICE/OUTPT VISIT, EST, LEVL IV, 30-39 MIN: ICD-10-PCS | Mod: 95,,, | Performed by: NURSE PRACTITIONER

## 2023-11-14 RX ORDER — DICYCLOMINE HYDROCHLORIDE 20 MG/1
20 TABLET ORAL 3 TIMES DAILY PRN
Qty: 60 TABLET | Refills: 2 | Status: SHIPPED | OUTPATIENT
Start: 2023-11-14

## 2023-11-14 NOTE — PROGRESS NOTES
Subjective:       Patient ID: Barney Kowalski is a 53 y.o. male.    Chief Complaint: Abdominal Pain    The patient location is: [place], [place]  The chief complaint leading to consultation is: left flank/left sided abdominal pain    Visit type: audiovisual    Face to Face time with patient: 15 minutes   30 minutes of total time spent on the encounter, which includes face to face time and non-face to face time preparing to see the patient (eg, review of tests), Obtaining and/or reviewing separately obtained history, Documenting clinical information in the electronic or other health record, Independently interpreting results (not separately reported) and communicating results to the patient/family/caregiver, or Care coordination (not separately reported).         Each patient to whom he or she provides medical services by telemedicine is:  (1) informed of the relationship between the physician and patient and the respective role of any other health care provider with respect to management of the patient; and (2) notified that he or she may decline to receive medical services by telemedicine and may withdraw from such care at any time.    Notes:     52 y/o male with history of GERD and chronic constipation presents for virtual visit with c/o left flank/abdominal pain. He was treated for diverticulitis in 9/2023. Pain briefly improved after antibiotic therapy with recurrence within the last 2-3 weeks. Does report frequent constipation. Takes Miralax prn.       Abdominal Pain  This is a recurrent problem. The current episode started 1 to 4 weeks ago. The problem occurs intermittently. The pain is located in the LLQ. The pain is at a severity of 5/10. The quality of the pain is aching and cramping. The abdominal pain radiates to the left flank and pelvis. Associated symptoms include constipation. Pertinent negatives include no anorexia, diarrhea, fever, hematochezia, melena, nausea, vomiting or weight loss. The pain is  aggravated by certain positions. The pain is relieved by Nothing. He has tried nothing for the symptoms.       Past Medical History:   Diagnosis Date    Anxiety     Anxiety and depression 8/1/2016    Depression     Diverticulitis of colon     GERD (gastroesophageal reflux disease)     History of alcohol dependence     Sober since 2009 - alcohol addiction - did 60 day inpatient services       Past Surgical History:   Procedure Laterality Date    COLONOSCOPY  10/29/2014    DIVERTICULOSIS SIGMOID COLON; INTERNAL HEMORRHOIDS; OTHERWISE NORMAL.  REPEAT IN 10 YEARS    COLONOSCOPY N/A 3/23/2022    Procedure: COLONOSCOPY;  Surgeon: Liza Canales MD;  Location: [facility];  Service: Endoscopy;  Laterality: N/A;    TONSILLECTOMY         Family History   Problem Relation Age of Onset    Hypertension Mother     No Known Problems Father     Breast cancer Maternal Grandmother 63    Hypertension Sister        Social History     Socioeconomic History    Marital status:    Occupational History     Employer: [employer]   Tobacco Use    Smoking status: Never    Smokeless tobacco: Never   Substance and Sexual Activity    Alcohol use: No     Alcohol/week: 0.0 standard drinks of alcohol    Drug use: No       Review of Systems   Constitutional:  Negative for fever and weight loss.   Respiratory:  Negative for shortness of breath.    Cardiovascular:  Negative for chest pain.   Gastrointestinal:  Positive for abdominal pain and constipation. Negative for anorexia, diarrhea, hematochezia, melena, nausea and vomiting.   Psychiatric/Behavioral:  Negative for dysphoric mood.          Objective:     There were no vitals filed for this visit.       Physical Exam  HENT:      Head: Normocephalic.   Eyes:      Conjunctiva/sclera: Conjunctivae normal.   Pulmonary:      Effort: Pulmonary effort is normal. No respiratory distress.   Musculoskeletal:         General: Normal range of motion.      Cervical back: Normal range of motion.   Skin:      Coloration: Skin is not jaundiced or pale.   Neurological:      Mental Status: He is alert and oriented to person, place, and time.   Psychiatric:         Mood and Affect: Mood normal.               Assessment:         ICD-10-CM ICD-9-CM   1. Left lower quadrant abdominal pain  R10.32 789.04   2. Left flank pain  R10.9 789.09   3. Chronic idiopathic constipation  K59.04 564.00       Plan:       Left lower quadrant abdominal pain; Left flank pain  -     CT Abdomen Pelvis  Without Contrast; Future; Expected date: 11/14/2023  -     dicyclomine (BENTYL) 20 mg tablet; Take 1 tablet (20 mg total) by mouth 3 (three) times daily as needed (abdominal pain).  Dispense: 60 tablet; Refill: 2    Chronic idiopathic constipation         -     Miralax daily    Follow up if symptoms worsen or fail to improve.     Patient's Medications   New Prescriptions    DICYCLOMINE (BENTYL) 20 MG TABLET    Take 1 tablet (20 mg total) by mouth 3 (three) times daily as needed (abdominal pain).   Previous Medications    ASPIRIN (ECOTRIN) 81 MG EC TABLET    Take 1 tablet (81 mg total) by mouth once daily.    CITALOPRAM (CELEXA) 10 MG TABLET    Take 10 mg by mouth once daily.    IBUPROFEN 200 MG CAP    Take by mouth as needed.    OMEPRAZOLE (PRILOSEC) 40 MG CAPSULE    Take 1 capsule (40 mg total) by mouth 2 (two) times daily before meals.   Modified Medications    No medications on file   Discontinued Medications    No medications on file

## 2023-11-14 NOTE — TELEPHONE ENCOUNTER
CT scan scheduled for 11/15/2023.    ----- Message from TIM Syed sent at 11/14/2023  3:35 PM CST -----  Schedule CT of abdomen

## 2023-11-30 ENCOUNTER — OFFICE VISIT (OUTPATIENT)
Dept: UROLOGY | Facility: CLINIC | Age: 53
End: 2023-11-30
Payer: COMMERCIAL

## 2023-11-30 VITALS
BODY MASS INDEX: 28.58 KG/M2 | HEART RATE: 54 BPM | DIASTOLIC BLOOD PRESSURE: 85 MMHG | SYSTOLIC BLOOD PRESSURE: 134 MMHG | HEIGHT: 74 IN | WEIGHT: 222.69 LBS

## 2023-11-30 DIAGNOSIS — R35.1 NOCTURIA: ICD-10-CM

## 2023-11-30 DIAGNOSIS — Z12.5 PROSTATE CANCER SCREENING: Primary | ICD-10-CM

## 2023-11-30 DIAGNOSIS — N39.8 VOIDING DYSFUNCTION: ICD-10-CM

## 2023-11-30 PROCEDURE — 99204 OFFICE O/P NEW MOD 45 MIN: CPT | Mod: S$GLB,,, | Performed by: UROLOGY

## 2023-11-30 PROCEDURE — 3008F BODY MASS INDEX DOCD: CPT | Mod: CPTII,S$GLB,, | Performed by: UROLOGY

## 2023-11-30 PROCEDURE — 99204 PR OFFICE/OUTPT VISIT, NEW, LEVL IV, 45-59 MIN: ICD-10-PCS | Mod: S$GLB,,, | Performed by: UROLOGY

## 2023-11-30 PROCEDURE — 3079F PR MOST RECENT DIASTOLIC BLOOD PRESSURE 80-89 MM HG: ICD-10-PCS | Mod: CPTII,S$GLB,, | Performed by: UROLOGY

## 2023-11-30 PROCEDURE — 1159F PR MEDICATION LIST DOCUMENTED IN MEDICAL RECORD: ICD-10-PCS | Mod: CPTII,S$GLB,, | Performed by: UROLOGY

## 2023-11-30 PROCEDURE — 3075F SYST BP GE 130 - 139MM HG: CPT | Mod: CPTII,S$GLB,, | Performed by: UROLOGY

## 2023-11-30 PROCEDURE — 3075F PR MOST RECENT SYSTOLIC BLOOD PRESS GE 130-139MM HG: ICD-10-PCS | Mod: CPTII,S$GLB,, | Performed by: UROLOGY

## 2023-11-30 PROCEDURE — 3008F PR BODY MASS INDEX (BMI) DOCUMENTED: ICD-10-PCS | Mod: CPTII,S$GLB,, | Performed by: UROLOGY

## 2023-11-30 PROCEDURE — 99999 PR PBB SHADOW E&M-EST. PATIENT-LVL III: CPT | Mod: PBBFAC,,, | Performed by: UROLOGY

## 2023-11-30 PROCEDURE — 1159F MED LIST DOCD IN RCRD: CPT | Mod: CPTII,S$GLB,, | Performed by: UROLOGY

## 2023-11-30 PROCEDURE — 99999 PR PBB SHADOW E&M-EST. PATIENT-LVL III: ICD-10-PCS | Mod: PBBFAC,,, | Performed by: UROLOGY

## 2023-11-30 PROCEDURE — 3079F DIAST BP 80-89 MM HG: CPT | Mod: CPTII,S$GLB,, | Performed by: UROLOGY

## 2023-11-30 RX ORDER — PRASUGREL 10 MG/1
TABLET, FILM COATED ORAL
COMMUNITY
Start: 2023-05-15

## 2023-11-30 NOTE — PROGRESS NOTES
[facility] - Urology   Clinic Note    SUBJECTIVE:     Chief Complaint   Patient presents with    Other     Prostate concerns        Referral from: Roque Coronel MD.    History of Present Illness:  Barney Kowalski is a 53 y.o. male who presents to clinic for voiding dysfunction.    Patient here for voiding dysfunction mild lower urinary tract symptoms.  He reports having nocturia 3 times per night.  He reports frequency throughout the day.  He states that he drinks 3 large cups of coffee throughout the day and drinks liquid before bedtime.  Additionally has sleep apnea and although this has improved he is noncompliant with his BiPAP.  Denies any weakness of stream.  Last PSA was 0.59.  No other complaints at this time.    Patient endorses no additional complaints at this time.    Past Medical History:   Diagnosis Date    Anxiety     Anxiety and depression 8/1/2016    Depression     Diverticulitis of colon     GERD (gastroesophageal reflux disease)     History of alcohol dependence     Sober since 2009 - alcohol addiction - did 60 day inpatient services       Past Surgical History:   Procedure Laterality Date    COLONOSCOPY  10/29/2014    DIVERTICULOSIS SIGMOID COLON; INTERNAL HEMORRHOIDS; OTHERWISE NORMAL.  REPEAT IN 10 YEARS    COLONOSCOPY N/A 3/23/2022    Procedure: COLONOSCOPY;  Surgeon: Liza Canales MD;  Location: [facility];  Service: Endoscopy;  Laterality: N/A;    TONSILLECTOMY         Family History   Problem Relation Age of Onset    Hypertension Mother     No Known Problems Father     Breast cancer Maternal Grandmother 63    Hypertension Sister        Social History     Tobacco Use    Smoking status: Never    Smokeless tobacco: Never   Substance Use Topics    Alcohol use: No     Alcohol/week: 0.0 standard drinks of alcohol    Drug use: No       Current Outpatient Medications on File Prior to Visit   Medication Sig Dispense Refill    aspirin (ECOTRIN) 81 MG EC tablet Take 1 tablet (81 mg total) by  "mouth once daily.  0    citalopram (CELEXA) 10 MG tablet Take 10 mg by mouth once daily.      dicyclomine (BENTYL) 20 mg tablet Take 1 tablet (20 mg total) by mouth 3 (three) times daily as needed (abdominal pain). 60 tablet 2    ibuprofen 200 mg Cap Take by mouth as needed.      prasugreL (EFFIENT) 10 mg Tab       omeprazole (PRILOSEC) 40 MG capsule Take 1 capsule (40 mg total) by mouth 2 (two) times daily before meals. (Patient taking differently: Take 40 mg by mouth daily as needed.) 30 capsule 1     Current Facility-Administered Medications on File Prior to Visit   Medication Dose Route Frequency Provider Last Rate Last Admin    sodium chloride 0.9% flush 10 mL  10 mL Intravenous PRN Owen Vergara MD           Review of patient's allergies indicates:  No Known Allergies    Review of Systems:  A review of 10+ systems was conducted with pertinent positive and negative findings documented in HPI with all other systems reviewed and negative.    OBJECTIVE:     Estimated body mass index is 28.59 kg/m² as calculated from the following:    Height as of this encounter: 6' 2" (1.88 m).    Weight as of this encounter: 101 kg (222 lb 10.6 oz).    Vital Signs (Most Recent)  Vitals:    11/30/23 1348   BP: 134/85   Pulse: (!) 54       Physical Exam:  GENERAL: patient sitting comfortably  HEENT: normocephalic  NECK: supple, no JVD  PULM: normal chest rise, no increased WOB  HEART: non-diaphoretic  ABDO: soft, nondistended, nontender  BACK: no CVA tenderness bilaterally  SKIN: warm, dry, well perfused  EXT: no bruising or edema  NEURO: grossly normal with no focal deficits  PSYCH: appropriate mood and affect    Genitourinary Exam:  deferred    Lab Results   Component Value Date    BUN 10 09/05/2023    CREATININE 0.87 09/05/2023    WBC 10.73 09/05/2023    HGB 16.4 09/05/2023    HCT 48.0 09/05/2023     09/05/2023    AST 29 09/05/2023    ALT 35 09/05/2023    ALKPHOS 68 09/05/2023    ALBUMIN 4.6 09/05/2023    HGBA1C 5.6 " 04/26/2019    INR 1.04 01/18/2016        Imaging:  I have personally reviewed all relevant imaging studies.    Results for orders placed or performed during the hospital encounter of 11/15/23 (from the past 2160 hour(s))   CT Abdomen Pelvis  Without Contrast    Narrative    EXAMINATION:  CT ABDOMEN PELVIS WITHOUT CONTRAST    CLINICAL HISTORY:  abd pain s/p colonoscopy 8/20/18; Unspecified abdominal pain    TECHNIQUE:  Low dose axial images, sagittal and coronal reformations were obtained from the lung bases to the pubic symphysis.  Contrast was not administered.    COMPARISON:  09/05/2023    FINDINGS:  ABDOMEN    Lung bases: Coronary artery calcifications are noted.  There is a calcified granuloma seen within the lingula.    Liver/gallbladder/biliary: There is a tiny possible cyst seen within segment 4 of the liver that measures approximately 4-5 mm in size and is best seen on series 2, image 36 and is stable when compared to the prior study.  The gallbladder is present and unremarkable.  No biliary ductal dilation.    Pancreas: The pancreas demonstrates a negative noncontrast appearance.    Spleen: The spleen is not enlarged.    Adrenals: Unremarkable    Kidneys: The kidneys are symmetric in size and demonstrate no evidence for nephrolithiasis or obstructive uropathy.There appears to be a probable cyst within the cortex of the lower pole of the right kidney that measures may be 7 mm in size.  There is also a small exophytic nodule seen projecting off the posterior aspect of the lower pole of the right kidney that measures may be 6 mm in size.  This is also stable.    Bowel/Mesentery: No evidence for bowel obstruction.  Prominent stool noted throughout the colon.  Diverticulosis noted within the descending and sigmoid colons.  No diverticulitis.  No mesenteric stranding or adenopathy.    Retroperitoneum: No adenopathy.  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  Minimal vascular calcification seen involving the aorta.   No aneurysm.    PELVIS    Genitourinary/Reproductive organs: Unremarkable    Adenopathy: None    Free Fluid: No free fluid    Osseus Structures/Soft tissues: No suspicious appearing osseus lesions.  There is mild chronic wedging of the T12 and L1 vertebral bodies.  No significant soft tissue abnormality.      Impression    1. No acute pathology noted within the abdomen or pelvis.  Previously noted inflammatory stranding adjacent to the distal descending colon has resolved in the interval.  Diverticulosis seen scattered throughout the colon.  No diverticulitis.  2. Remaining incidental findings as described above and stable.      Electronically signed by: Khris Lozada DO  Date:    11/15/2023  Time:    08:59   Results for orders placed or performed during the hospital encounter of 09/05/23 (from the past 2160 hour(s))   CT Renal Stone Study ABD Pelvis WO    Narrative    EXAM: CT RENAL STONE STUDY ABD PELVIS WO    CLINICAL INDICATION:  Flank pain kidney stone    TECHNIQUE: Axial and multiplanar 2-D reformations provided    FINDINGS:  No comparison imaging  Kidneys without hydronephrosis or sizable calcification.  No hydroureter.    There is diverticulitis distal descending colon without overt perforation or abscess.  No fluid collection seen.  No signs of appendicitis and no obstructive bowel findings.    Liver and spleen normal size.  All bladder present  Pancreas unremarkable  No aortic aneurysm  Fat-containing right inguinal hernia    Urinary bladder decompressed unremarkable        Impression     Distal descending colon diverticulitis uncomplicated    All CT scans at [this location] are performed using dose modulation techniques as appropriate to a performed exam including the following: automated exposure control; adjustment of the mA and/or kV according to patient size (this includes techniques or standardized protocols for targeted exams where dose is matched to indication / reason for exam; i.e. extremities  or head); use of iterative reconstruction technique.    Finalized on: 9/5/2023 2:38 PM By:  Ria Huber MD  [facility]# [id number]      2023-09-05 14:40:[id number]    [facility]     No results found for this or any previous visit (from the past 2160 hour(s)).  CT Abdomen Pelvis  Without Contrast  Narrative: EXAMINATION:  CT ABDOMEN PELVIS WITHOUT CONTRAST    CLINICAL HISTORY:  abd pain s/p colonoscopy 8/20/18; Unspecified abdominal pain    TECHNIQUE:  Low dose axial images, sagittal and coronal reformations were obtained from the lung bases to the pubic symphysis.  Contrast was not administered.    COMPARISON:  09/05/2023    FINDINGS:  ABDOMEN    Lung bases: Coronary artery calcifications are noted.  There is a calcified granuloma seen within the lingula.    Liver/gallbladder/biliary: There is a tiny possible cyst seen within segment 4 of the liver that measures approximately 4-5 mm in size and is best seen on series 2, image 36 and is stable when compared to the prior study.  The gallbladder is present and unremarkable.  No biliary ductal dilation.    Pancreas: The pancreas demonstrates a negative noncontrast appearance.    Spleen: The spleen is not enlarged.    Adrenals: Unremarkable    Kidneys: The kidneys are symmetric in size and demonstrate no evidence for nephrolithiasis or obstructive uropathy.There appears to be a probable cyst within the cortex of the lower pole of the right kidney that measures may be 7 mm in size.  There is also a small exophytic nodule seen projecting off the posterior aspect of the lower pole of the right kidney that measures may be 6 mm in size.  This is also stable.    Bowel/Mesentery: No evidence for bowel obstruction.  Prominent stool noted throughout the colon.  Diverticulosis noted within the descending and sigmoid colons.  No diverticulitis.  No mesenteric stranding or adenopathy.    Retroperitoneum: No adenopathy.  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  Minimal vascular calcification seen  "involving the aorta.  No aneurysm.    PELVIS    Genitourinary/Reproductive organs: Unremarkable    Adenopathy: None    Free Fluid: No free fluid    Osseus Structures/Soft tissues: No suspicious appearing osseus lesions.  There is mild chronic wedging of the T12 and L1 vertebral bodies.  No significant soft tissue abnormality.  Impression: 1. No acute pathology noted within the abdomen or pelvis.  Previously noted inflammatory stranding adjacent to the distal descending colon has resolved in the interval.  Diverticulosis seen scattered throughout the colon.  No diverticulitis.  2. Remaining incidental findings as described above and stable.    Electronically signed by: Khris Lozada DO  Date:    11/15/2023  Time:    08:59       PSA:  Lab Results   Component Value Date    PSA 0.59 04/26/2019       Testosterone:  No results found for: "TOTALTESTOST", "TESTOSTERONE"     ASSESSMENT     1. Prostate cancer screening    2. Voiding dysfunction    3. Nocturia        PLAN:     Discussed options, patient has nocturia likely related to sleep apnea.  Will attempt to be more compliant with his therapy for this.  Patient can continue with prostate cancer screening with PCP  Next PSA with annual labs.  Follow up with me as needed.    Roque Coronel MD  Urology  [facility]    Disclaimer: This note has been generated using voice-recognition software. There may be typographical errors that have been missed during proof-reading.     "

## 2024-01-26 ENCOUNTER — PATIENT MESSAGE (OUTPATIENT)
Dept: UROLOGY | Facility: CLINIC | Age: 54
End: 2024-01-26
Payer: COMMERCIAL

## 2024-02-07 ENCOUNTER — OFFICE VISIT (OUTPATIENT)
Dept: DERMATOLOGY | Facility: CLINIC | Age: 54
End: 2024-02-07

## 2024-02-07 DIAGNOSIS — Z12.83 SKIN CANCER SCREENING: ICD-10-CM

## 2024-02-07 DIAGNOSIS — D48.5 NEOPLASM OF UNCERTAIN BEHAVIOR OF SKIN: Primary | ICD-10-CM

## 2024-02-07 DIAGNOSIS — D22.9 MULTIPLE BENIGN NEVI: ICD-10-CM

## 2024-02-07 DIAGNOSIS — D23.9 DERMATOFIBROMA: ICD-10-CM

## 2024-02-07 PROCEDURE — 99203 OFFICE O/P NEW LOW 30 MIN: CPT | Mod: S$PBB,,, | Performed by: DERMATOLOGY

## 2024-02-07 PROCEDURE — 99999 PR PBB SHADOW E&M-EST. PATIENT-LVL III: CPT | Mod: PBBFAC,,, | Performed by: DERMATOLOGY

## 2024-02-07 PROCEDURE — 99213 OFFICE O/P EST LOW 20 MIN: CPT | Mod: PBBFAC,PN | Performed by: DERMATOLOGY

## 2024-02-07 NOTE — PROGRESS NOTES
Subjective:      Patient ID:  Barney Kowalski is a 53 y.o. male who presents for   Chief Complaint   Patient presents with    Lesion     Rt neck     Patient here for lesion on rt neck    Last seen by dermatologist: 10 yrs ago    no - personal history of atypical moles removed  no - personal history of MM   no - family history of MM  yes - childhood blistering sunburns  no - tanning bed use  no - personal history of NMSC    Patient with specific complaint of lesion(s)  Location: rt neck  Duration: not sure how long - just noticed it yesterday  Symptoms: red, raised. Not itchy or painful  Relieving factors/Previous treatments: none        Review of Systems   Skin:  Positive for activity-related sunscreen use and wears hat. Negative for daily sunscreen use and recent sunburn.   Hematologic/Lymphatic: Bruises/bleeds easily (asp, blood thinners).       Objective:   Physical Exam   Constitutional: He appears well-developed and well-nourished. No distress.   Neurological: He is alert and oriented to person, place, and time. He is not disoriented.   Psychiatric: He has a normal mood and affect.   Skin:   Areas Examined (abnormalities noted in diagram):   Head / Face Inspection Performed  Neck Inspection Performed  Chest / Axilla Inspection Performed  Abdomen Inspection Performed  Back Inspection Performed  RUE Inspected  LUE Inspection Performed                Diagram Legend     Erythematous scaling macule/papule c/w actinic keratosis       Vascular papule c/w angioma      Pigmented verrucoid papule/plaque c/w seborrheic keratosis      Yellow umbilicated papule c/w sebaceous hyperplasia      Irregularly shaped tan macule c/w lentigo     1-2 mm smooth white papules consistent with Milia      Movable subcutaneous cyst with punctum c/w epidermal inclusion cyst      Subcutaneous movable cyst c/w pilar cyst      Firm pink to brown papule c/w dermatofibroma      Pedunculated fleshy papule(s) c/w skin tag(s)      Evenly  pigmented macule c/w junctional nevus     Mildly variegated pigmented, slightly irregular-bordered macule c/w mildly atypical nevus      Flesh colored to evenly pigmented papule c/w intradermal nevus       Pink pearly papule/plaque c/w basal cell carcinoma      Erythematous hyperkeratotic cursted plaque c/w SCC      Surgical scar with no sign of skin cancer recurrence      Open and closed comedones      Inflammatory papules and pustules      Verrucoid papule consistent consistent with wart     Erythematous eczematous patches and plaques     Dystrophic onycholytic nail with subungual debris c/w onychomycosis     Umbilicated papule    Erythematous-base heme-crusted tan verrucoid plaque consistent with inflamed seborrheic keratosis     Erythematous Silvery Scaling Plaque c/w Psoriasis     See annotation      Assessment / Plan:        Neoplasm of uncertain behavior of skin- traumatized nevus vs bug bite vs other  Offered bx vs monitor. Pt prefers to monitor for now. Will notify me if discoloration has not improved in 3 wks    Multiple benign nevi  Benign-appearing nevi present on exam today. Reassurance provided. Periodically examine moles and return to clinic if any moles change or become symptomatic (bleeding, itching, pain, etc).    Dermatofibroma  Reassurance given to patient. No treatment is necessary.  This is benign scar tissue from previous minor trauma to the skin such as a bug bite.    Skin cancer screening  Upper body skin examination performed today including at least 6 points as noted in physical examination. No lesions suspicious for malignancy noted.  Patient instructed in importance of daily broad spectrum sunscreen use with spf at least 30. Sun avoidance and topical protection/protective clothing discussed.    Follow up in about 4 weeks (around 3/6/2024) for lesion on neck if still present/growing/changing.

## 2024-02-07 NOTE — PATIENT INSTRUCTIONS

## 2024-04-15 ENCOUNTER — PATIENT MESSAGE (OUTPATIENT)
Dept: GASTROENTEROLOGY | Facility: CLINIC | Age: 54
End: 2024-04-15

## 2025-01-30 ENCOUNTER — PATIENT MESSAGE (OUTPATIENT)
Dept: UROLOGY | Facility: CLINIC | Age: 55
End: 2025-01-30
Payer: COMMERCIAL

## 2025-01-30 DIAGNOSIS — R30.0 DYSURIA: Primary | ICD-10-CM

## 2025-02-04 ENCOUNTER — OFFICE VISIT (OUTPATIENT)
Dept: UROLOGY | Facility: CLINIC | Age: 55
End: 2025-02-04
Payer: COMMERCIAL

## 2025-02-04 ENCOUNTER — LAB VISIT (OUTPATIENT)
Dept: LAB | Facility: HOSPITAL | Age: 55
End: 2025-02-04
Payer: COMMERCIAL

## 2025-02-04 VITALS
SYSTOLIC BLOOD PRESSURE: 131 MMHG | WEIGHT: 217.38 LBS | HEART RATE: 65 BPM | DIASTOLIC BLOOD PRESSURE: 83 MMHG | HEIGHT: 74 IN | BODY MASS INDEX: 27.9 KG/M2

## 2025-02-04 DIAGNOSIS — Z12.5 PROSTATE CANCER SCREENING: ICD-10-CM

## 2025-02-04 DIAGNOSIS — R35.1 NOCTURIA: Primary | ICD-10-CM

## 2025-02-04 LAB
POC RESIDUAL URINE VOLUME: 156 ML (ref 0–100)
PROSTATE SPECIFIC ANTIGEN, TOTAL: 0.56 NG/ML (ref 0–4)
PSA FREE MFR SERPL: 32.14 %
PSA FREE SERPL-MCNC: 0.18 NG/ML (ref 0–1.5)

## 2025-02-04 PROCEDURE — 99999 PR PBB SHADOW E&M-EST. PATIENT-LVL III: CPT | Mod: PBBFAC,,,

## 2025-02-04 PROCEDURE — 1160F RVW MEDS BY RX/DR IN RCRD: CPT | Mod: CPTII,S$GLB,,

## 2025-02-04 PROCEDURE — 51798 US URINE CAPACITY MEASURE: CPT | Mod: S$GLB,,,

## 2025-02-04 PROCEDURE — 1159F MED LIST DOCD IN RCRD: CPT | Mod: CPTII,S$GLB,,

## 2025-02-04 PROCEDURE — 3008F BODY MASS INDEX DOCD: CPT | Mod: CPTII,S$GLB,,

## 2025-02-04 PROCEDURE — 36415 COLL VENOUS BLD VENIPUNCTURE: CPT

## 2025-02-04 PROCEDURE — 3079F DIAST BP 80-89 MM HG: CPT | Mod: CPTII,S$GLB,,

## 2025-02-04 PROCEDURE — 99214 OFFICE O/P EST MOD 30 MIN: CPT | Mod: S$GLB,,,

## 2025-02-04 PROCEDURE — 3075F SYST BP GE 130 - 139MM HG: CPT | Mod: CPTII,S$GLB,,

## 2025-02-04 PROCEDURE — 84153 ASSAY OF PSA TOTAL: CPT

## 2025-02-04 RX ORDER — EZETIMIBE 10 MG/1
10 TABLET ORAL
COMMUNITY
Start: 2024-12-26

## 2025-02-04 RX ORDER — BEMPEDOIC ACID 180 MG/1
1 TABLET, FILM COATED ORAL
COMMUNITY
Start: 2024-12-05

## 2025-02-04 NOTE — PROGRESS NOTES
Subjective:       Patient ID: Barney Kowalski is a 54 y.o. male.    Chief Complaint: Urinary Frequency     This is a 54 y.o.  male patient that is new to me but not new to the system.  This is a patient of Dr. Coronel. He has history of voiding dysfunction, nocturia x3, urinary frequency. Also has histroy of sleep apnea, non-compliant with BiPAP per Dr. Coronel's last note.  Last PSA in 2019-- 0.59  Today patient reports increased urinary frequency, urgency and nocturia for the past two weeks. Also reports that he was having a burning pain while urinating. These symptoms have resolved within the past couple of days. Reports that urinary symptoms are back to baseline consisting of nocturia x1.   Daily fluids consist of coke zero, 2 large cups of coffee in the morning and water. Reports that he does drink fluids up until bedtime.  Urine culture 1/31/25-- negative  , last void was 2 hours ago.    LAST PSA  Lab Results   Component Value Date    PSA 0.59 04/26/2019       Lab Results   Component Value Date    CREATININE 0.87 09/05/2023       ---  PMH/PSH/Medications/Allergies/Social history reviewed and as in chart.    Review of Systems   Constitutional:  Negative for activity change, chills and fever.   Respiratory:  Negative for shortness of breath.    Cardiovascular:  Negative for chest pain and palpitations.   Gastrointestinal:  Negative for abdominal pain and constipation.   Genitourinary:  Negative for difficulty urinating, dysuria, flank pain, frequency, hematuria and urgency.   Neurological:  Negative for dizziness and light-headedness.       Objective:      Physical Exam  HENT:      Head: Normocephalic.   Pulmonary:      Effort: Pulmonary effort is normal.   Musculoskeletal:         General: Normal range of motion.      Cervical back: Normal range of motion.   Skin:     General: Skin is warm and dry.   Neurological:      Mental Status: He is alert and oriented to person, place, and time.          Assessment:     Problem Noted   Nocturia 2/4/2025   Prostate Cancer Screening 10/31/2016       Plan:     PSA lab ordered for today, will message with results.  Avoid bladder irritants including but not limited to caffeine, alcohol, smoking, spicy foods, acidic foods, tomato-based products, citrus, artificial sweeteners, chocolate, coffee or tea.  Stop fluids 2 hours before bed. Drink 2L of water daily.  Discussed short course of antibiotics if symptoms return vs trial of flomax.  Follow-up PRN for problems or concerns.    TIM Charles    I spent a total of 30 minutes on the day of the visit.This includes face to face time and non-face to face time preparing to see the patient (eg, review of tests), obtaining and/or reviewing separately obtained history, documenting clinical information in the electronic or other health record, independently interpreting results and communicating results to the patient/family/caregiver, or care coordinator.

## 2025-02-05 ENCOUNTER — PATIENT MESSAGE (OUTPATIENT)
Dept: UROLOGY | Facility: CLINIC | Age: 55
End: 2025-02-05
Payer: COMMERCIAL

## 2025-02-05 DIAGNOSIS — Z12.5 PROSTATE CANCER SCREENING: Primary | ICD-10-CM

## 2025-03-23 ENCOUNTER — PATIENT MESSAGE (OUTPATIENT)
Dept: UROLOGY | Facility: CLINIC | Age: 55
End: 2025-03-23
Payer: COMMERCIAL

## 2025-03-26 DIAGNOSIS — R10.9 ABDOMINAL PAIN, UNSPECIFIED ABDOMINAL LOCATION: Primary | ICD-10-CM

## 2025-03-31 ENCOUNTER — RESULTS FOLLOW-UP (OUTPATIENT)
Dept: UROLOGY | Facility: CLINIC | Age: 55
End: 2025-03-31

## 2025-08-22 ENCOUNTER — OFFICE VISIT (OUTPATIENT)
Dept: PRIMARY CARE CLINIC | Facility: CLINIC | Age: 55
End: 2025-08-22
Payer: COMMERCIAL

## 2025-08-22 ENCOUNTER — PATIENT OUTREACH (OUTPATIENT)
Dept: ADMINISTRATIVE | Facility: HOSPITAL | Age: 55
End: 2025-08-22
Payer: COMMERCIAL

## 2025-08-22 VITALS
HEART RATE: 54 BPM | SYSTOLIC BLOOD PRESSURE: 138 MMHG | HEIGHT: 74 IN | OXYGEN SATURATION: 98 % | WEIGHT: 207.44 LBS | BODY MASS INDEX: 26.62 KG/M2 | DIASTOLIC BLOOD PRESSURE: 78 MMHG

## 2025-08-22 DIAGNOSIS — Z00.00 ANNUAL PHYSICAL EXAM: Primary | ICD-10-CM

## 2025-08-22 DIAGNOSIS — F41.9 ANXIETY: ICD-10-CM

## 2025-08-22 DIAGNOSIS — Z13.1 SCREENING FOR DIABETES MELLITUS: ICD-10-CM

## 2025-08-22 DIAGNOSIS — Z95.5 H/O HEART ARTERY STENT: ICD-10-CM

## 2025-08-22 PROCEDURE — 99999 PR PBB SHADOW E&M-EST. PATIENT-LVL III: CPT | Mod: PBBFAC,,, | Performed by: STUDENT IN AN ORGANIZED HEALTH CARE EDUCATION/TRAINING PROGRAM

## 2025-08-22 RX ORDER — CITALOPRAM 10 MG/1
10 TABLET ORAL DAILY
Qty: 90 TABLET | Refills: 3 | Status: SHIPPED | OUTPATIENT
Start: 2025-08-22